# Patient Record
Sex: FEMALE | Race: WHITE | NOT HISPANIC OR LATINO | Employment: FULL TIME | URBAN - METROPOLITAN AREA
[De-identification: names, ages, dates, MRNs, and addresses within clinical notes are randomized per-mention and may not be internally consistent; named-entity substitution may affect disease eponyms.]

---

## 2017-09-15 ENCOUNTER — GENERIC CONVERSION - ENCOUNTER (OUTPATIENT)
Dept: OTHER | Facility: OTHER | Age: 27
End: 2017-09-15

## 2017-09-26 ENCOUNTER — OFFICE VISIT (OUTPATIENT)
Dept: URGENT CARE | Facility: MEDICAL CENTER | Age: 27
End: 2017-09-26
Payer: COMMERCIAL

## 2017-09-26 PROCEDURE — 99203 OFFICE O/P NEW LOW 30 MIN: CPT

## 2018-01-09 NOTE — MISCELLANEOUS
Message   Recorded as Task  Date: 03/04/2016 11:59 AM, Created By: Suzy Mccarthy  Task Name: Call Back  Assigned To: Melody Ayers OB,Nurse Schedule  Regarding Patient: Elmon Prader, Status: In Progress  CommentCarlen Kras - 04 Mar 2016 11:59 AM    TASK CREATED  Caller: Self; [phone] (Home); [phone] (Work)  pt called - she has been having really bad upper back pain  She is asking if there is anything she can do to help the pain  please advise [phone]  Fabiola Finn - 04 Mar 2016 2:03 PM    TASK IN PROGRESS  Fabiola Finn - 04 Mar 2016 2:17 PM    TASK REPLIED TO: Previously Assigned To LASHAE OB,Nurse Schedule  p/c to pt upon questiong pt she was seen in [facility] by Dr Aliyah Cota yesterday and was just recovering from GI bug  Pt was instructed to hydrate  today pt denies CxT, lof, or bleeding and does have +FM  I instructed pt to really concentrate on fluids rather than foods  Pedialyte, chicken soup, Jello, apple sauce, ginger ale, since pt does not have an appetite  Also pt may use warm moist heat and 2 extra strength Tylenol for pain  Pt was agreeable with this plan  Active Problems    1  Encounter for prenatal care in third trimester of first pregnancy (V22 0) (Z34 03)   2  Encounter for supervision of normal first pregnancy in third trimester (V22 0) (Z34 03)   3  Positive GBS test (041 02) (B95 1)   4  Pregnancy (V22 2) (Z33 1)   5  Rubella non-immune status, antepartum (646 83,V15 83) (O99 89,Z28 3)   6  Susceptible to varicella (non-immune), currently pregnant (V49 89) (O09 899,Z28 3)    Current Meds   1  Prenatal Vitamin TABS; TAKE 1 TABLET DAILY AS DIRECTED; Therapy: (Recorded:[id number]) to Recorded    Allergies    1  No Known Drug Allergies    2  No Known Food Allergies   3   Seasonal    Signatures   Electronically signed by : Loco Mcgraw, ; Mar  4 2016  2:17PM EST                       (Author)

## 2018-01-12 NOTE — MISCELLANEOUS
Message  Pt called office  Has spoken with providers in the past about some rectal pressure, today pressure seems to be in bottom and in perineum  Pt has good fetal movements, denies ctx or leaking of fluids  Upon questioning pt states belly does feel hard at times, 1-2x/hour  Pt describes some minor back pain  Pt advised this may be the start of things changing as she prepares for labor  Pt to monitor belly tightening and watch for ctx  Pt advised to call this weekend with any changes or concerns including decreased movement, fluid leaking, increase in ctx  Pt to monitor for now, advised to call with changes  Pt comfortable with this  Active Problems    1  Encounter for prenatal care in third trimester of first pregnancy (V22 0) (Z34 03)   2  Encounter for supervision of normal first pregnancy in third trimester (V22 0) (Z34 03)   3  Positive GBS test (041 02) (B95 1)   4  Pregnancy (V22 2) (Z33 1)   5  Rubella non-immune status, antepartum (646 83,V15 83) (O99 89,Z28 3)   6  Susceptible to varicella (non-immune), currently pregnant (V49 89) (O09 899,Z28 3)    Current Meds   1  Prenatal Vitamin TABS; TAKE 1 TABLET DAILY AS DIRECTED; Therapy: (Recorded:[id number]) to Recorded    Allergies    1  No Known Drug Allergies    2  No Known Food Allergies   3   Seasonal    Signatures   Electronically signed by : Carmelita Amaya, ; Mar 25 2016  2:13PM EST                       (Author)

## 2018-01-13 NOTE — MISCELLANEOUS
Message   Recorded as Task   Date: 02/01/2016 08:42 AM, Created By: Charito Claudio   Task Name: Call Back   Assigned To: Sarthak Hawley   Regarding Patient: Paras Briceño, Status: Active   CommentSommer Oconnor - 01 Feb 2016 8:42 AM     TASK CREATED  Caller: Self; [phone] (Home); [phone] (Work)  pt called - she has been having a lot of lower pressure  please advise [phone]   Valentin Rea - 01 Feb 2016 9:30 AM     TASK REPLIED TO: Previously Assigned To KRISTINGA OB,Team  Pt has mentioned this to providers at previous apt  Has had increased pelvic pressure when standing, and some sharp pains in vaginal area when moving  Pt aware this can be related to babys position  Pt has good fetal movement and denies and bleeding, cramping, fluid leaking  Has been having infrequent brx-harris  Pt advised to try maternity sling to help with pressure  Can try extra strength tylenol, and warm heat - nothing too hot  Pt will try this and knows when to call office for signs of labor or other concerns  Active Problems    1  Pregnancy (V22 2) (Z33 1)   2  Rubella non-immune status, antepartum (646 83,V15 83) (O99 89,Z28 3)   3  Susceptible to varicella (non-immune), currently pregnant (V49 89) (O09 899,Z28 3)    Current Meds   1  Prenatal Vitamin TABS; TAKE 1 TABLET DAILY AS DIRECTED; Therapy: (Recorded:[id number]) to Recorded    Allergies    1  No Known Drug Allergies    2  No Known Food Allergies   3   Seasonal    Signatures   Electronically signed by : Prakash Denis, ; Feb 1 2016  9:30AM EST                       (Author)

## 2018-01-22 VITALS
SYSTOLIC BLOOD PRESSURE: 100 MMHG | WEIGHT: 174 LBS | HEIGHT: 66 IN | BODY MASS INDEX: 27.97 KG/M2 | DIASTOLIC BLOOD PRESSURE: 72 MMHG

## 2018-04-20 PROBLEM — M54.16 LUMBAR RADICULOPATHY: Status: ACTIVE | Noted: 2017-09-26

## 2018-04-23 ENCOUNTER — OFFICE VISIT (OUTPATIENT)
Dept: OBGYN CLINIC | Facility: CLINIC | Age: 28
End: 2018-04-23
Payer: COMMERCIAL

## 2018-04-23 VITALS — DIASTOLIC BLOOD PRESSURE: 58 MMHG | WEIGHT: 168.6 LBS | BODY MASS INDEX: 27.63 KG/M2 | SYSTOLIC BLOOD PRESSURE: 110 MMHG

## 2018-04-23 DIAGNOSIS — N91.2 AMENORRHEA: Primary | ICD-10-CM

## 2018-04-23 PROCEDURE — 76801 OB US < 14 WKS SINGLE FETUS: CPT | Performed by: OBSTETRICS & GYNECOLOGY

## 2018-04-23 NOTE — PROGRESS NOTES
Early OB Ultrasound Procedure Note  Dony Devine  YOB: 1990      Referring Physician: Self, Referral     Technician: Study performed by the interpreting physician    Indications:  amenorrhea   /58   Wt 76 5 kg (168 lb 9 6 oz)   LMP 01/28/2018 (Exact Date)   BMI 27 63 kg/m²     Patient's last menstrual period was 01/28/2018 (exact date)  , , with EGA of  12 weeks and 1   days    Patient denies vaginal bleeding or brown discharge      Procedure Details  A gestational sac is seen containing a yolk sac and a narayanan embryo    Subchorionic lucency is absent     The embryonic crown-rump length measures 1 53  cm  and calculates to an estimated gestational age of 11 weeks and 0   days     Embryonic cardiac activity is  present  Description of fetal anatomy Normal  Description of ovaries: normal  Description of uterus: normal    Findings:  Viable, narayanan intrauterine pregnancy at 8 weeks,  0 days, with a calculated EDC of December 3, 2018

## 2018-05-07 ENCOUNTER — INITIAL PRENATAL (OUTPATIENT)
Dept: OBGYN CLINIC | Facility: CLINIC | Age: 28
End: 2018-05-07

## 2018-05-07 DIAGNOSIS — Z34.81 PRENATAL CARE, SUBSEQUENT PREGNANCY, FIRST TRIMESTER: Primary | ICD-10-CM

## 2018-05-07 PROCEDURE — OBC: Performed by: OBSTETRICS & GYNECOLOGY

## 2018-05-07 RX ORDER — PNV NO.95/FERROUS FUM/FOLIC AC 28MG-0.8MG
1 TABLET ORAL DAILY
COMMUNITY
End: 2018-05-22

## 2018-05-08 VITALS — WEIGHT: 166 LBS | DIASTOLIC BLOOD PRESSURE: 64 MMHG | SYSTOLIC BLOOD PRESSURE: 100 MMHG | BODY MASS INDEX: 27.2 KG/M2

## 2018-05-08 NOTE — PROGRESS NOTES
Patient here for prenatal intake    Patient lives at home with spouse and 3year old daughter Socorro Stanley  Planned pregnancy  Very happy  Works in  that daughter attends  Obstetric history includes  at 41 wks gestation  Precipitous delivery  Used  for delivery and would like to do the same with this baby  Routine prenatal labs ordered  Declines sequential screening  Did receive Flu vaccine this season  Oriented to new office and expected schedule of visits  Patient is feeling well other than nausea  Denies pain or bleeding  Reviewed meds safe to use in pregnancy  Patient has no further questions at this time  All questions answered

## 2018-05-14 ENCOUNTER — APPOINTMENT (OUTPATIENT)
Dept: LAB | Facility: MEDICAL CENTER | Age: 28
End: 2018-05-14
Payer: COMMERCIAL

## 2018-05-14 DIAGNOSIS — Z34.81 PRENATAL CARE, SUBSEQUENT PREGNANCY, FIRST TRIMESTER: ICD-10-CM

## 2018-05-14 LAB
ABO GROUP BLD: NORMAL
BACTERIA UR QL AUTO: ABNORMAL /HPF
BASOPHILS # BLD AUTO: 0.02 THOUSANDS/ΜL (ref 0–0.1)
BASOPHILS NFR BLD AUTO: 0 % (ref 0–1)
BILIRUB UR QL STRIP: NEGATIVE
BLD GP AB SCN SERPL QL: NEGATIVE
CLARITY UR: CLEAR
COLOR UR: YELLOW
EOSINOPHIL # BLD AUTO: 0.05 THOUSAND/ΜL (ref 0–0.61)
EOSINOPHIL NFR BLD AUTO: 1 % (ref 0–6)
ERYTHROCYTE [DISTWIDTH] IN BLOOD BY AUTOMATED COUNT: 12.9 % (ref 11.6–15.1)
GLUCOSE UR STRIP-MCNC: NEGATIVE MG/DL
HCT VFR BLD AUTO: 38.2 % (ref 34.8–46.1)
HGB BLD-MCNC: 12.6 G/DL (ref 11.5–15.4)
HGB UR QL STRIP.AUTO: ABNORMAL
HYALINE CASTS #/AREA URNS LPF: ABNORMAL /LPF
KETONES UR STRIP-MCNC: NEGATIVE MG/DL
LEUKOCYTE ESTERASE UR QL STRIP: ABNORMAL
LYMPHOCYTES # BLD AUTO: 2.65 THOUSANDS/ΜL (ref 0.6–4.47)
LYMPHOCYTES NFR BLD AUTO: 38 % (ref 14–44)
MCH RBC QN AUTO: 30 PG (ref 26.8–34.3)
MCHC RBC AUTO-ENTMCNC: 33 G/DL (ref 31.4–37.4)
MCV RBC AUTO: 91 FL (ref 82–98)
MONOCYTES # BLD AUTO: 0.48 THOUSAND/ΜL (ref 0.17–1.22)
MONOCYTES NFR BLD AUTO: 7 % (ref 4–12)
NEUTROPHILS # BLD AUTO: 3.79 THOUSANDS/ΜL (ref 1.85–7.62)
NEUTS SEG NFR BLD AUTO: 54 % (ref 43–75)
NITRITE UR QL STRIP: NEGATIVE
NON-SQ EPI CELLS URNS QL MICRO: ABNORMAL /HPF
NRBC BLD AUTO-RTO: 0 /100 WBCS
PH UR STRIP.AUTO: 6.5 [PH] (ref 4.5–8)
PLATELET # BLD AUTO: 232 THOUSANDS/UL (ref 149–390)
PMV BLD AUTO: 10 FL (ref 8.9–12.7)
PROT UR STRIP-MCNC: NEGATIVE MG/DL
RBC # BLD AUTO: 4.2 MILLION/UL (ref 3.81–5.12)
RBC #/AREA URNS AUTO: ABNORMAL /HPF
RH BLD: POSITIVE
RUBV IGG SERPL IA-ACNC: 8.7 IU/ML
SP GR UR STRIP.AUTO: 1.01 (ref 1–1.03)
SPECIMEN EXPIRATION DATE: NORMAL
UROBILINOGEN UR QL STRIP.AUTO: 0.2 E.U./DL
WBC # BLD AUTO: 7 THOUSAND/UL (ref 4.31–10.16)
WBC #/AREA URNS AUTO: ABNORMAL /HPF

## 2018-05-14 PROCEDURE — 80081 OBSTETRIC PANEL INC HIV TSTG: CPT

## 2018-05-14 PROCEDURE — 81001 URINALYSIS AUTO W/SCOPE: CPT

## 2018-05-14 PROCEDURE — 36415 COLL VENOUS BLD VENIPUNCTURE: CPT

## 2018-05-14 PROCEDURE — 87086 URINE CULTURE/COLONY COUNT: CPT

## 2018-05-15 LAB
BACTERIA UR CULT: NORMAL
HBV SURFACE AG SER QL: NORMAL
RPR SER QL: NORMAL

## 2018-05-16 LAB — HIV 1+2 AB+HIV1 P24 AG SERPL QL IA: NORMAL

## 2018-05-22 ENCOUNTER — ROUTINE PRENATAL (OUTPATIENT)
Dept: OBGYN CLINIC | Facility: CLINIC | Age: 28
End: 2018-05-22

## 2018-05-22 VITALS — DIASTOLIC BLOOD PRESSURE: 66 MMHG | SYSTOLIC BLOOD PRESSURE: 102 MMHG | WEIGHT: 167.8 LBS | BODY MASS INDEX: 27.5 KG/M2

## 2018-05-22 DIAGNOSIS — Z34.91 ENCOUNTER FOR PREGNANCY RELATED EXAMINATION IN FIRST TRIMESTER: ICD-10-CM

## 2018-05-22 DIAGNOSIS — Z12.4 CERVICAL CANCER SCREENING: ICD-10-CM

## 2018-05-22 DIAGNOSIS — F41.9 ANXIETY DURING PREGNANCY, ANTEPARTUM: ICD-10-CM

## 2018-05-22 DIAGNOSIS — O99.340 ANXIETY DURING PREGNANCY, ANTEPARTUM: ICD-10-CM

## 2018-05-22 DIAGNOSIS — Z34.81 ENCOUNTER FOR SUPERVISION OF OTHER NORMAL PREGNANCY IN FIRST TRIMESTER: Primary | ICD-10-CM

## 2018-05-22 DIAGNOSIS — O22.41 HEMORRHOIDS DURING PREGNANCY IN FIRST TRIMESTER: ICD-10-CM

## 2018-05-22 PROBLEM — Z34.90 SUPERVISION OF NORMAL PREGNANCY: Status: ACTIVE | Noted: 2018-05-22

## 2018-05-22 PROCEDURE — G0145 SCR C/V CYTO,THINLAYER,RESCR: HCPCS | Performed by: NURSE PRACTITIONER

## 2018-05-22 PROCEDURE — 87491 CHLMYD TRACH DNA AMP PROBE: CPT | Performed by: NURSE PRACTITIONER

## 2018-05-22 PROCEDURE — 87591 N.GONORRHOEAE DNA AMP PROB: CPT | Performed by: NURSE PRACTITIONER

## 2018-05-22 PROCEDURE — PNV: Performed by: NURSE PRACTITIONER

## 2018-05-22 NOTE — ASSESSMENT & PLAN NOTE
Small external nonthrombosed hemorrhoid present  Reviewed meds safe in preg  Encouraged increased hydration

## 2018-05-22 NOTE — ASSESSMENT & PLAN NOTE
Jorge Luis Hence describes intermittent symptoms of anxiety since first delivery 2 years ago  This is manifested as excessively worrying about her daughter's safety  She denies depression, SI/HI; she has good support from her spouse  She demonstrates approp behavior with good insight at today's visit  We discussed management options of therapy, support group, medical management  The patient elects expectant management for now   She is aware she may f/u any time for referral

## 2018-05-22 NOTE — ASSESSMENT & PLAN NOTE
PN1   EDC by 1st trimester US  OB hx of prior term  with uncomplicated prenatal course  Jazmine Krueger denies complaints  Exam WNL   by Doppler  Prenatal labs with equivocal rubella status, otherwise WNL  Rh pos  Pap and gc/chl sent today  Discussed low risk aneuploidy screening - the patient declines  Encouraged scheduling L2  Reviewed diet/activity recommendations, practice set up, visit intervals and reasons to call

## 2018-05-22 NOTE — PROGRESS NOTES
Problem List Items Addressed This Visit     RESOLVED: Encounter for pregnancy related examination in first trimester    Relevant Orders    Chlamydia/GC amplified DNA by PCR    Supervision of normal pregnancy - Primary     PN1  EDC by 1st trimester US  OB hx of prior term  with uncomplicated prenatal course  Tamara Villa denies complaints  Exam WNL   by Doppler  Prenatal labs with equivocal rubella status, otherwise WNL  Rh pos  Pap and gc/chl sent today  Discussed low risk aneuploidy screening - the patient declines  Encouraged scheduling L2  Reviewed diet/activity recommendations, practice set up, visit intervals and reasons to call  Hemorrhoids during pregnancy in first trimester     Small external nonthrombosed hemorrhoid present  Reviewed meds safe in preg  Encouraged increased hydration  Anxiety during pregnancy, antepartum     Tamara Villa describes intermittent symptoms of anxiety since first delivery 2 years ago  This is manifested as excessively worrying about her daughter's safety  She denies depression, SI/HI; she has good support from her spouse  She demonstrates approp behavior with good insight at today's visit  We discussed management options of therapy, support group, medical management  The patient elects expectant management for now   She is aware she may f/u any time for referral             Other Visit Diagnoses     Cervical cancer screening        Relevant Orders    Liquid-based pap, screening

## 2018-05-23 LAB
CHLAMYDIA DNA CVX QL NAA+PROBE: NORMAL
N GONORRHOEA DNA GENITAL QL NAA+PROBE: NORMAL

## 2018-05-25 LAB
LAB AP GYN PRIMARY INTERPRETATION: NORMAL
Lab: NORMAL

## 2018-05-30 DIAGNOSIS — Z34.92 PRENATAL CARE IN SECOND TRIMESTER: Primary | ICD-10-CM

## 2018-05-31 ENCOUNTER — TELEPHONE (OUTPATIENT)
Dept: OBGYN CLINIC | Facility: CLINIC | Age: 28
End: 2018-05-31

## 2018-05-31 NOTE — TELEPHONE ENCOUNTER
----- Message from Teagan Leiva, 10 Hemanth Guerin sent at 5/31/2018  5:03 PM EDT -----  Normal pap   Please notify patient by cell per her request

## 2018-06-08 ENCOUNTER — TELEPHONE (OUTPATIENT)
Dept: OBGYN CLINIC | Facility: CLINIC | Age: 28
End: 2018-06-08

## 2018-06-08 NOTE — TELEPHONE ENCOUNTER
Spoke with pt - gave her all the advisement and recommendations from Vahid about her questions regarding pregnancy  Patient was very happy with these advisements

## 2018-06-08 NOTE — TELEPHONE ENCOUNTER
Feeling movement with baby #2 may be as early as 14ish weeks or as late as 20ish weeks  Most people report feeling this around 15-16 weeks  Rashes can be common in preg  If no itchy or raised and not accompanied by other sx then likely benign, but use best judgement and f/u with PCP if needed  Right shoulder blade pain could be related to musculoskeletal changes or it could be GI etiology  If gallbladder then this would be exacerbated by eating  F/u with PCP as needed for this as well  Please provide reassurance that none of these conditions sound worrisome however we are happy to see her any time if sx become severe or are accompanied by pelvic pain, bleeding

## 2018-06-08 NOTE — TELEPHONE ENCOUNTER
----- Message from Katalinachuck Huntley sent at 6/8/2018 12:46 PM EDT -----  Regarding: Non-Urgent Medical Question  Contact: [phone]  Ji Pace,     I had just a few silly questions before my next appointment and I didn't want to forget them  For baby #2 when do you start to feel kicks? Can you develop what seems like a rash (not itchy or raised)? I have a dull pain in my right shoulder towards my shoulder blade that I'm wondering if it could be resulting in pregnancy nonsense? None of these questions are important but I know I will forget them by Tuesday! Thanks!

## 2018-06-19 ENCOUNTER — ROUTINE PRENATAL (OUTPATIENT)
Dept: OBGYN CLINIC | Facility: CLINIC | Age: 28
End: 2018-06-19

## 2018-06-19 VITALS — SYSTOLIC BLOOD PRESSURE: 118 MMHG | WEIGHT: 164 LBS | BODY MASS INDEX: 26.88 KG/M2 | DIASTOLIC BLOOD PRESSURE: 76 MMHG

## 2018-06-19 DIAGNOSIS — Z34.82 MULTIGRAVIDA IN SECOND TRIMESTER: Primary | ICD-10-CM

## 2018-06-19 PROCEDURE — PNV: Performed by: PHYSICIAN ASSISTANT

## 2018-06-19 NOTE — PROGRESS NOTES
Problem List Items Addressed This Visit     Multigravida in second trimester - Primary     Feels well overall  Stressed because it took 1 5 hours to get here due to traffic  Declined genetic testing  No fetal movement yet  Has level II scheduled  All first OB labs, Pap and cultures neg

## 2018-06-19 NOTE — ASSESSMENT & PLAN NOTE
Feels well overall  Stressed because it took 1 5 hours to get here due to traffic  Declined genetic testing  No fetal movement yet  Has level II scheduled  All first OB labs, Pap and cultures neg

## 2018-07-17 ENCOUNTER — ROUTINE PRENATAL (OUTPATIENT)
Dept: PERINATAL CARE | Facility: CLINIC | Age: 28
End: 2018-07-17
Payer: COMMERCIAL

## 2018-07-17 ENCOUNTER — ROUTINE PRENATAL (OUTPATIENT)
Dept: OBGYN CLINIC | Facility: CLINIC | Age: 28
End: 2018-07-17

## 2018-07-17 VITALS
SYSTOLIC BLOOD PRESSURE: 106 MMHG | WEIGHT: 170.9 LBS | HEART RATE: 76 BPM | HEIGHT: 66 IN | DIASTOLIC BLOOD PRESSURE: 70 MMHG | BODY MASS INDEX: 27.47 KG/M2

## 2018-07-17 VITALS — BODY MASS INDEX: 27.28 KG/M2 | WEIGHT: 169 LBS | DIASTOLIC BLOOD PRESSURE: 50 MMHG | SYSTOLIC BLOOD PRESSURE: 108 MMHG

## 2018-07-17 DIAGNOSIS — Z3A.20 20 WEEKS GESTATION OF PREGNANCY: ICD-10-CM

## 2018-07-17 DIAGNOSIS — Z36.3 ENCOUNTER FOR ANTENATAL SCREENING FOR MALFORMATIONS: Primary | ICD-10-CM

## 2018-07-17 DIAGNOSIS — O35.8XX0 ECHOGENIC FOCUS OF HEART OF FETUS AFFECTING ANTEPARTUM CARE OF MOTHER, SINGLE OR UNSPECIFIED FETUS: ICD-10-CM

## 2018-07-17 DIAGNOSIS — Z36.86 ENCOUNTER FOR ANTENATAL SCREENING FOR CERVICAL LENGTH: ICD-10-CM

## 2018-07-17 DIAGNOSIS — Z34.82 MULTIGRAVIDA IN SECOND TRIMESTER: Primary | ICD-10-CM

## 2018-07-17 DIAGNOSIS — Z34.92 PRENATAL CARE IN SECOND TRIMESTER: ICD-10-CM

## 2018-07-17 PROCEDURE — 76817 TRANSVAGINAL US OBSTETRIC: CPT | Performed by: OBSTETRICS & GYNECOLOGY

## 2018-07-17 PROCEDURE — 76811 OB US DETAILED SNGL FETUS: CPT | Performed by: OBSTETRICS & GYNECOLOGY

## 2018-07-17 PROCEDURE — 99212 OFFICE O/P EST SF 10 MIN: CPT | Performed by: OBSTETRICS & GYNECOLOGY

## 2018-07-17 PROCEDURE — PNV: Performed by: OBSTETRICS & GYNECOLOGY

## 2018-07-17 NOTE — PROGRESS NOTES
A transvaginal ultrasound was performed  Sonographer note on use of High Level Disinfection Process (Trophon) for transvaginal probe# 1 used, serial [id number]    Stan Bass

## 2018-07-17 NOTE — PROGRESS NOTES
Level 2 done today- It's a boy! Has a 28 week growth scan scheduled secondary to ?intracardiac echogenic focus (report not available)  Told not to worry because it was otherwise a normal anatomy scan  She is hoping for another natural   Discussed ways to combat leg cramps in pregnancy

## 2018-07-18 PROBLEM — O35.8XX0 ECHOGENIC FOCUS OF HEART OF FETUS AFFECTING ANTEPARTUM CARE OF MOTHER: Status: ACTIVE | Noted: 2018-07-18

## 2018-07-18 NOTE — PROGRESS NOTES
Please refer to the [facility] ultrasound report in OB procedures for additional information regarding the visit today

## 2018-08-13 ENCOUNTER — TELEPHONE (OUTPATIENT)
Dept: OBGYN CLINIC | Facility: CLINIC | Age: 28
End: 2018-08-13

## 2018-08-13 NOTE — TELEPHONE ENCOUNTER
Pt called office today, left voicemail message  Pt's RYAN is 12/3/18, GA 24 wks + 0 dys,  2 Para 1  Pt is scheduled to see Dr Jewell Hi tomorrow  Pt states she became very dizzy at work, left work early, was able to drive home  Pt further states she feels "okay", is scheduled to see provider tomorrow, just wanted to alert staff of dizziness  Pt can be reached @ [phone]

## 2018-08-13 NOTE — TELEPHONE ENCOUNTER
I spoke with patient, she is feeling better, sitting down, drinking gatorade seemed to help  I told her to be sure she is eating and drinking throughout the day, she will discuss tomorrow

## 2018-08-14 ENCOUNTER — ROUTINE PRENATAL (OUTPATIENT)
Dept: OBGYN CLINIC | Facility: CLINIC | Age: 28
End: 2018-08-14

## 2018-08-14 VITALS — BODY MASS INDEX: 28.73 KG/M2 | WEIGHT: 178 LBS | DIASTOLIC BLOOD PRESSURE: 60 MMHG | SYSTOLIC BLOOD PRESSURE: 114 MMHG

## 2018-08-14 DIAGNOSIS — Z34.82 MULTIGRAVIDA IN SECOND TRIMESTER: Primary | ICD-10-CM

## 2018-08-14 DIAGNOSIS — Z34.93 PREGNANCY, OBSTETRICAL CARE, THIRD TRIMESTER: ICD-10-CM

## 2018-08-14 PROCEDURE — PNV: Performed by: OBSTETRICS & GYNECOLOGY

## 2018-08-14 NOTE — PROGRESS NOTES
Here for 24 week appt,  Pt had a near syncopal episode yesterday at work  She thinks she may have stood up too fast  She did not lose consciousness but did go home early and rested for the day  Baby was moving well throughout the episode and afterwards  She also has a patch on the back of her left leg of vericose veins that are bothering her  Otherwise no complaints  28 week lab slip given

## 2018-08-15 NOTE — PROGRESS NOTES
Patient doing well  Suspect near syncopal episode was secondary to quick position change, she is feeling well today  Continuing to maintain good hydration  Has follow up ultrasound 3rd trimester  Area of concern in her thigh consistent with bruise - no palpable or visible varicosities

## 2018-08-17 ENCOUNTER — APPOINTMENT (OUTPATIENT)
Dept: LAB | Facility: HOSPITAL | Age: 28
End: 2018-08-17
Attending: OBSTETRICS & GYNECOLOGY
Payer: COMMERCIAL

## 2018-08-17 DIAGNOSIS — Z34.93 PREGNANCY, OBSTETRICAL CARE, THIRD TRIMESTER: ICD-10-CM

## 2018-08-17 LAB
BASOPHILS # BLD AUTO: 0.02 THOUSANDS/ΜL (ref 0–0.1)
BASOPHILS NFR BLD AUTO: 0 % (ref 0–1)
EOSINOPHIL # BLD AUTO: 0.07 THOUSAND/ΜL (ref 0–0.61)
EOSINOPHIL NFR BLD AUTO: 1 % (ref 0–6)
ERYTHROCYTE [DISTWIDTH] IN BLOOD BY AUTOMATED COUNT: 13.2 % (ref 11.6–15.1)
GLUCOSE 1H P 50 G GLC PO SERPL-MCNC: 132 MG/DL
HCT VFR BLD AUTO: 35.5 % (ref 34.8–46.1)
HGB BLD-MCNC: 11.5 G/DL (ref 11.5–15.4)
IMM GRANULOCYTES # BLD AUTO: 0.07 THOUSAND/UL (ref 0–0.2)
IMM GRANULOCYTES NFR BLD AUTO: 1 % (ref 0–2)
LYMPHOCYTES # BLD AUTO: 1.98 THOUSANDS/ΜL (ref 0.6–4.47)
LYMPHOCYTES NFR BLD AUTO: 27 % (ref 14–44)
MCH RBC QN AUTO: 30.3 PG (ref 26.8–34.3)
MCHC RBC AUTO-ENTMCNC: 32.4 G/DL (ref 31.4–37.4)
MCV RBC AUTO: 93 FL (ref 82–98)
MONOCYTES # BLD AUTO: 0.4 THOUSAND/ΜL (ref 0.17–1.22)
MONOCYTES NFR BLD AUTO: 5 % (ref 4–12)
NEUTROPHILS # BLD AUTO: 4.88 THOUSANDS/ΜL (ref 1.85–7.62)
NEUTS SEG NFR BLD AUTO: 66 % (ref 43–75)
NRBC BLD AUTO-RTO: 0 /100 WBCS
PLATELET # BLD AUTO: 193 THOUSANDS/UL (ref 149–390)
PMV BLD AUTO: 9.8 FL (ref 8.9–12.7)
RBC # BLD AUTO: 3.8 MILLION/UL (ref 3.81–5.12)
WBC # BLD AUTO: 7.42 THOUSAND/UL (ref 4.31–10.16)

## 2018-08-17 PROCEDURE — 86592 SYPHILIS TEST NON-TREP QUAL: CPT

## 2018-08-17 PROCEDURE — 85025 COMPLETE CBC W/AUTO DIFF WBC: CPT

## 2018-08-17 PROCEDURE — 36415 COLL VENOUS BLD VENIPUNCTURE: CPT

## 2018-08-17 PROCEDURE — 82950 GLUCOSE TEST: CPT

## 2018-08-20 ENCOUNTER — TELEPHONE (OUTPATIENT)
Dept: OBGYN CLINIC | Facility: CLINIC | Age: 28
End: 2018-08-20

## 2018-08-20 ENCOUNTER — ROUTINE PRENATAL (OUTPATIENT)
Dept: OBGYN CLINIC | Facility: CLINIC | Age: 28
End: 2018-08-20

## 2018-08-20 VITALS — DIASTOLIC BLOOD PRESSURE: 54 MMHG | SYSTOLIC BLOOD PRESSURE: 102 MMHG | BODY MASS INDEX: 28.57 KG/M2 | WEIGHT: 177 LBS

## 2018-08-20 DIAGNOSIS — Z34.92 SUPERVISION OF LOW-RISK PREGNANCY, SECOND TRIMESTER: ICD-10-CM

## 2018-08-20 DIAGNOSIS — M54.9 BACK PAIN AFFECTING PREGNANCY IN SECOND TRIMESTER: Primary | ICD-10-CM

## 2018-08-20 DIAGNOSIS — O99.891 BACK PAIN AFFECTING PREGNANCY IN SECOND TRIMESTER: Primary | ICD-10-CM

## 2018-08-20 LAB
BACTERIA UR QL AUTO: ABNORMAL /HPF
BILIRUB UR QL STRIP: NEGATIVE
CLARITY UR: CLEAR
COLOR UR: YELLOW
GLUCOSE UR STRIP-MCNC: NEGATIVE MG/DL
HGB UR QL STRIP.AUTO: NEGATIVE
HYALINE CASTS #/AREA URNS LPF: ABNORMAL /LPF
KETONES UR STRIP-MCNC: NEGATIVE MG/DL
LEUKOCYTE ESTERASE UR QL STRIP: ABNORMAL
NITRITE UR QL STRIP: NEGATIVE
NON-SQ EPI CELLS URNS QL MICRO: ABNORMAL /HPF
PH UR STRIP.AUTO: 6.5 [PH] (ref 4.5–8)
PROT UR STRIP-MCNC: NEGATIVE MG/DL
RBC #/AREA URNS AUTO: ABNORMAL /HPF
RPR SER QL: NORMAL
SP GR UR STRIP.AUTO: 1.01 (ref 1–1.03)
UROBILINOGEN UR QL STRIP.AUTO: 0.2 E.U./DL
WBC #/AREA URNS AUTO: ABNORMAL /HPF

## 2018-08-20 PROCEDURE — PNV: Performed by: NURSE PRACTITIONER

## 2018-08-20 PROCEDURE — 87147 CULTURE TYPE IMMUNOLOGIC: CPT | Performed by: NURSE PRACTITIONER

## 2018-08-20 PROCEDURE — 81001 URINALYSIS AUTO W/SCOPE: CPT | Performed by: NURSE PRACTITIONER

## 2018-08-20 PROCEDURE — 87086 URINE CULTURE/COLONY COUNT: CPT | Performed by: NURSE PRACTITIONER

## 2018-08-20 NOTE — ASSESSMENT & PLAN NOTE
C/o right sided mid back pain occurring intermittently x4 days  Onset occurs at night while laying down  This is dull to stabbing  She denies exacerbating factors; she does find comfort if she elevates her right leg while laying down  Tylenol and heating pad are ineffective  She denies fever, chills, dysuria, hematuria, N/V/D/C  Denies ctx/cramping/LOF/VB  Kenia Bullockrs is in NAD and well appearing  Afebrile  Exam without CVA tenderness or point tenderness on palpation of spine, SI joint  Abd is soft, NT, gravid  Advised MSK etiology suspected given positional presentation  Low suspicion of pyelo or hydronephrosis although urine studies sent and would consider renal US if pain worsens or does not improve  Discussed comfort measures incl massage, chiro and PT  The patient declines PT referral at thsi time but is aware she may f/u any time  Advised to keep next appt as scheduled

## 2018-08-20 NOTE — PROGRESS NOTES
Patient c/o right sided pain  She is taking Tylenol but no relief  She can't get comfortable due to the pain  It mostly happens when she is laying down

## 2018-08-20 NOTE — TELEPHONE ENCOUNTER
Samia - You need to call and triage this    It is not appropriate to send an email like this directly to a provider - it needs to be triaged first

## 2018-08-20 NOTE — TELEPHONE ENCOUNTER
----- Message from Qian Castro sent at 8/18/2018 12:03 PM EDT -----  Regarding: Non-Urgent Medical Question  Contact: [phone]  Ji Paez! I had a quick question  I have been having pretty constant pain on my right side where my natural waist is, making it super hard to sleep because even with pillows it just hurts  Anything you recommend doing? ? Is it just round ligament pain I have to deal with? ? Thanks!       Baudilio Friedman

## 2018-08-20 NOTE — PROGRESS NOTES
Problem List Items Addressed This Visit     Supervision of low-risk pregnancy, second trimester     Denies OB complaints  Good fetal movement  Denies contractions, cramping, leakage of fluid or vaginal bleeding  Reviewed reasons to call  Back pain affecting pregnancy in second trimester - Primary     C/o right sided mid back pain occurring intermittently x4 days  Onset occurs at night while laying down  This is dull to stabbing  She denies exacerbating factors; she does find comfort if she elevates her right leg while laying down  Tylenol and heating pad are ineffective  She denies fever, chills, dysuria, hematuria, N/V/D/C  Denies ctx/cramping/LOF/VB  Romeo Chandler is in NAD and well appearing  Afebrile  Exam without CVA tenderness or point tenderness on palpation of spine, SI joint  Abd is soft, NT, gravid  Advised MSK etiology suspected given positional presentation  Low suspicion of pyelo or hydronephrosis although urine studies sent and would consider renal US if pain worsens or does not improve  Discussed comfort measures incl massage, chiro and PT  The patient declines PT referral at thsi time but is aware she may f/u any time  Advised to keep next appt as scheduled             Relevant Orders    Urine culture    Urinalysis with reflex to microscopic

## 2018-08-20 NOTE — TELEPHONE ENCOUNTER
Spoke with pt - pain has been happening for a couple of days - it is around where her natural waist would be  But only on the right side  The pain would only be when she is laying down - when she is sitting or standing it is fine  Using a heating pad and taking tylenol  No leakage of fluid, headaches or dizziness  Good FM - no contractions  No urinary symptoms  Patient is worried  Spoke with Paul Hugo - she advised pt to come in   Patient is scheduled to see Paul Hugo today at 1pm

## 2018-08-22 ENCOUNTER — TELEPHONE (OUTPATIENT)
Dept: OBGYN CLINIC | Facility: CLINIC | Age: 28
End: 2018-08-22

## 2018-08-22 LAB
BACTERIA UR CULT: ABNORMAL
BACTERIA UR CULT: ABNORMAL

## 2018-08-22 NOTE — TELEPHONE ENCOUNTER
----- Message from Andrew Parra, 10 Hemanth Guerin sent at 8/22/2018  5:25 PM EDT -----  Normal urine studies   These were ordered for r/o pyelo  Please notify patient

## 2018-09-05 ENCOUNTER — TELEPHONE (OUTPATIENT)
Dept: OBGYN CLINIC | Facility: CLINIC | Age: 28
End: 2018-09-05

## 2018-09-05 NOTE — TELEPHONE ENCOUNTER
Incoming call from patient 27 wks gestation  Patient works in  for years  Has two children that have been diagnosed with hand foot mouth syndrome  Would like to know if she needs to take any additional precautions  Currently asymptomatic

## 2018-09-05 NOTE — TELEPHONE ENCOUNTER
----- Message from Dominic Hayes sent at 9/4/2018  8:01 PM EDT -----  Regarding: Non-Urgent Medical Question  Contact: [phone]  Ji Waldron! Quick question  I work at a  and one infant (9 months) was diagnosed yesterday morning with a vey mild case of HFM  He came in in the AM and we saw blisters and called mom  He was diagnosed with roseola and mild HFM and the doctor said he was absolutely not contagious and was given a note to return to  tomorrow  Should I be worried??? I'm a little nervous with my unborn kiddo here and my two year old  Please advise! Thanks so much!

## 2018-09-05 NOTE — TELEPHONE ENCOUNTER
Left message for patient not a concern in pregnancy  Encouraged good hand hygiene  Advised to call back with any symptoms are concerns

## 2018-09-10 ENCOUNTER — ULTRASOUND (OUTPATIENT)
Dept: PERINATAL CARE | Facility: CLINIC | Age: 28
End: 2018-09-10
Payer: COMMERCIAL

## 2018-09-10 ENCOUNTER — ROUTINE PRENATAL (OUTPATIENT)
Dept: OBGYN CLINIC | Facility: CLINIC | Age: 28
End: 2018-09-10

## 2018-09-10 VITALS — BODY MASS INDEX: 29.57 KG/M2 | SYSTOLIC BLOOD PRESSURE: 100 MMHG | DIASTOLIC BLOOD PRESSURE: 60 MMHG | WEIGHT: 183.2 LBS

## 2018-09-10 VITALS
HEART RATE: 97 BPM | BODY MASS INDEX: 29.25 KG/M2 | DIASTOLIC BLOOD PRESSURE: 71 MMHG | SYSTOLIC BLOOD PRESSURE: 104 MMHG | HEIGHT: 66 IN | WEIGHT: 182 LBS

## 2018-09-10 DIAGNOSIS — R82.71 GBS BACTERIURIA: ICD-10-CM

## 2018-09-10 DIAGNOSIS — Z3A.28 28 WEEKS GESTATION OF PREGNANCY: ICD-10-CM

## 2018-09-10 DIAGNOSIS — Z34.93 SUPERVISION OF LOW-RISK PREGNANCY, THIRD TRIMESTER: Primary | ICD-10-CM

## 2018-09-10 DIAGNOSIS — M54.9 BACK PAIN AFFECTING PREGNANCY IN SECOND TRIMESTER: ICD-10-CM

## 2018-09-10 DIAGNOSIS — O99.891 BACK PAIN AFFECTING PREGNANCY IN SECOND TRIMESTER: ICD-10-CM

## 2018-09-10 DIAGNOSIS — Z36.89 ENCOUNTER FOR FETAL ANATOMIC SURVEY: Primary | ICD-10-CM

## 2018-09-10 PROCEDURE — 76816 OB US FOLLOW-UP PER FETUS: CPT | Performed by: OBSTETRICS & GYNECOLOGY

## 2018-09-10 PROCEDURE — PNV: Performed by: NURSE PRACTITIONER

## 2018-09-10 PROCEDURE — 99212 OFFICE O/P EST SF 10 MIN: CPT | Performed by: OBSTETRICS & GYNECOLOGY

## 2018-09-10 RX ORDER — LORATADINE 10 MG/1
10 TABLET ORAL DAILY
COMMUNITY
End: 2018-12-28 | Stop reason: ALTCHOICE

## 2018-09-10 NOTE — ASSESSMENT & PLAN NOTE
<10k GBS count present in 8/20/18 urine culture  Reviewed results with patient and advised intrapartum antibiotic prophylaxis

## 2018-09-10 NOTE — PROGRESS NOTES
Please refer to the [facility] ultrasound report in Ob Procedures for additional information regarding the visit to the [facility]  today

## 2018-09-10 NOTE — PROGRESS NOTES
Problem List Items Addressed This Visit     Supervision of low-risk pregnancy, third trimester - Primary     Denies OB complaints  Good fetal movement  Denies contractions, cramping, leakage of fluid or vaginal bleeding  28 wk labs WNL  US today for growth and missed anatomy - EFW 81%, AC 82%, KARAN 19 2, vertex  BPD >95% and this concerns her  Reassurance provided; discussed late 3rd trimester considerations if baby ends up being LGA  Lawerence Bob desires to defer tdap until next visit  Baby and Me considerations reinforced  Reviewed  labor precautions and FKCs  Back pain affecting pregnancy in second trimester     Resolved  GBS bacteriuria     <10k GBS count present in 18 urine culture  Reviewed results with patient and advised intrapartum antibiotic prophylaxis

## 2018-09-10 NOTE — LETTER
September 10, 2018     Darin Etienne [address]    Patient: Tapan Lara   YOB: 1990   Date of Visit: 9/10/2018       Dear Dr James Harris: Thank you for referring Justice Orta to me for evaluation  Below are my notes for this consultation  If you have questions, please do not hesitate to call me  I look forward to following your patient along with you  Sincerely,        Mainor Muniz MD        CC: No Recipients  Mainor Muniz MD  9/10/2018  1:56 PM  Sign at close encounter  Please refer to the [facility] ultrasound report in Ob Procedures for additional information regarding the [facility]  today

## 2018-09-10 NOTE — ASSESSMENT & PLAN NOTE
Denies OB complaints  Good fetal movement  Denies contractions, cramping, leakage of fluid or vaginal bleeding  28 wk labs WNL  US today for growth and missed anatomy - EFW 81%, AC 82%, KRAAN 19 2, vertex  BPD >95% and this concerns her  Reassurance provided; discussed late 3rd trimester considerations if baby ends up being LGA  Luis A Carlton desires to defer tdap until next visit  Baby and Me considerations reinforced  Reviewed  labor precautions and FKCs

## 2018-09-25 ENCOUNTER — ROUTINE PRENATAL (OUTPATIENT)
Dept: OBGYN CLINIC | Facility: CLINIC | Age: 28
End: 2018-09-25
Payer: COMMERCIAL

## 2018-09-25 VITALS — SYSTOLIC BLOOD PRESSURE: 112 MMHG | WEIGHT: 181.8 LBS | BODY MASS INDEX: 29.34 KG/M2 | DIASTOLIC BLOOD PRESSURE: 60 MMHG

## 2018-09-25 DIAGNOSIS — Z23 NEED FOR TETANUS, DIPHTHERIA, AND ACELLULAR PERTUSSIS (TDAP) VACCINE IN PATIENT OF ADOLESCENT AGE OR OLDER: ICD-10-CM

## 2018-09-25 DIAGNOSIS — Z34.93 SUPERVISION OF LOW-RISK PREGNANCY, THIRD TRIMESTER: Primary | ICD-10-CM

## 2018-09-25 PROCEDURE — 90715 TDAP VACCINE 7 YRS/> IM: CPT

## 2018-09-25 PROCEDURE — PNV: Performed by: NURSE PRACTITIONER

## 2018-09-25 PROCEDURE — 90471 IMMUNIZATION ADMIN: CPT

## 2018-09-25 NOTE — ASSESSMENT & PLAN NOTE
Denies OB complaints  Good fetal movement  Denies contractions, cramping, leakage of fluid or vaginal bleeding  Tdap administered today  Pt will defer flu vaccine until next visit  Baby and Me considerations reinforced  Reviewed  labor precautions and FKCs

## 2018-09-25 NOTE — PROGRESS NOTES
Problem List Items Addressed This Visit     Supervision of low-risk pregnancy, third trimester - Primary     Denies OB complaints  Good fetal movement  Denies contractions, cramping, leakage of fluid or vaginal bleeding  Tdap administered today  Pt will defer flu vaccine until next visit  Baby and Me considerations reinforced  Reviewed  labor precautions and FKCs                Other Visit Diagnoses     Need for tetanus, diphtheria, and acellular pertussis (Tdap) vaccine in patient of adolescent age or older        Relevant Orders    TDAP Vaccine greater than or equal to 6yo (Completed)

## 2018-10-09 ENCOUNTER — ROUTINE PRENATAL (OUTPATIENT)
Dept: OBGYN CLINIC | Facility: CLINIC | Age: 28
End: 2018-10-09
Payer: COMMERCIAL

## 2018-10-09 VITALS — DIASTOLIC BLOOD PRESSURE: 60 MMHG | BODY MASS INDEX: 29.86 KG/M2 | SYSTOLIC BLOOD PRESSURE: 110 MMHG | WEIGHT: 185 LBS

## 2018-10-09 DIAGNOSIS — Z34.93 SUPERVISION OF LOW-RISK PREGNANCY, THIRD TRIMESTER: Primary | ICD-10-CM

## 2018-10-09 DIAGNOSIS — Z23 NEED FOR INFLUENZA VACCINATION: ICD-10-CM

## 2018-10-09 PROCEDURE — PNV: Performed by: OBSTETRICS & GYNECOLOGY

## 2018-10-09 PROCEDURE — 90471 IMMUNIZATION ADMIN: CPT | Performed by: OBSTETRICS & GYNECOLOGY

## 2018-10-09 PROCEDURE — 90686 IIV4 VACC NO PRSV 0.5 ML IM: CPT | Performed by: OBSTETRICS & GYNECOLOGY

## 2018-10-09 NOTE — PROGRESS NOTES
Tony Olivera is doing well  Some BH contractions, nothing that has worried her  S/P Flu and TDAP  Rh positive  PTL precautions reviewed

## 2018-10-25 ENCOUNTER — ROUTINE PRENATAL (OUTPATIENT)
Dept: OBGYN CLINIC | Facility: CLINIC | Age: 28
End: 2018-10-25

## 2018-10-25 VITALS — BODY MASS INDEX: 30.8 KG/M2 | DIASTOLIC BLOOD PRESSURE: 62 MMHG | WEIGHT: 190.8 LBS | SYSTOLIC BLOOD PRESSURE: 110 MMHG

## 2018-10-25 DIAGNOSIS — Z34.93 THIRD TRIMESTER PREGNANCY: ICD-10-CM

## 2018-10-25 DIAGNOSIS — Z34.93 SUPERVISION OF LOW-RISK PREGNANCY, THIRD TRIMESTER: Primary | ICD-10-CM

## 2018-10-25 DIAGNOSIS — O36.8390 FETAL ARRHYTHMIA AFFECTING PREGNANCY, ANTEPARTUM: ICD-10-CM

## 2018-10-25 PROCEDURE — PNV: Performed by: OBSTETRICS & GYNECOLOGY

## 2018-10-25 NOTE — ASSESSMENT & PLAN NOTE
Irregularity on auscultation today  Suspect PACs, NST reactive 140's  No evidence tachy arrhythmia  She will see [facility] tomorrow, 10/26 for follow up scan  Kick counts reviewed, reassurance provided

## 2018-10-26 ENCOUNTER — ULTRASOUND (OUTPATIENT)
Dept: PERINATAL CARE | Facility: CLINIC | Age: 28
End: 2018-10-26
Payer: COMMERCIAL

## 2018-10-26 VITALS
SYSTOLIC BLOOD PRESSURE: 103 MMHG | DIASTOLIC BLOOD PRESSURE: 70 MMHG | HEIGHT: 66 IN | WEIGHT: 189 LBS | HEART RATE: 90 BPM | BODY MASS INDEX: 30.37 KG/M2

## 2018-10-26 DIAGNOSIS — O36.8390 FETAL ARRHYTHMIA AFFECTING PREGNANCY, ANTEPARTUM: ICD-10-CM

## 2018-10-26 DIAGNOSIS — O36.63X0 LARGE FOR GESTATIONAL AGE FETUS AFFECTING MANAGEMENT OF MOTHER, ANTEPARTUM, THIRD TRIMESTER, NOT APPLICABLE OR UNSPECIFIED FETUS: Primary | ICD-10-CM

## 2018-10-26 DIAGNOSIS — Z34.93 THIRD TRIMESTER PREGNANCY: ICD-10-CM

## 2018-10-26 DIAGNOSIS — D18.01 CHERRY ANGIOMA: ICD-10-CM

## 2018-10-26 DIAGNOSIS — Z3A.34 34 WEEKS GESTATION OF PREGNANCY: ICD-10-CM

## 2018-10-26 PROCEDURE — 99213 OFFICE O/P EST LOW 20 MIN: CPT | Performed by: OBSTETRICS & GYNECOLOGY

## 2018-10-26 PROCEDURE — 76816 OB US FOLLOW-UP PER FETUS: CPT | Performed by: OBSTETRICS & GYNECOLOGY

## 2018-10-26 NOTE — PROGRESS NOTES
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 that has been there for 4-5 days and has had bleeding episodes x3 including this morning  In review of some articles on Google she will try apple cider vinegar on it but if not working treatment can be cryotherapy  She is seeing Dr Astrid Garcia next  Will see if that is an option to be completed in his office  Fetal growth today in the 82nd percentile  Her prior ultrasound was in the 81st percentile  Fetus appears symmetric  Her last baby weighed 8 pounds 2 ounces and was born at 36 weeks and she reports a 45 minutes 2nd stage without complications  That baby was a girl and this baby is felt to be a boy and Sunnie Hammans feels this pregnancy does feel bigger than her prior pregnancy  She had a nml glucola of 132  Irregular PACs are noted on today's ultrasound  She will decrease her caffeine intake to one cup of coffee per day to see if that improves the arrhythmia  She has had a 20 week ultrasound in the past which showed no fetal cardiac malformations  Recommend weekly fetal heart tones by Dopplers to detect if there is a sustained SVT  This can be done in her OB office  Recommend one last growth scan in four weeks if undelivered  She will review with Dr Astrid Garcia at her next visit to see if his office has cryotherapy that can be used to treat her cherry angioma if the apple cider vinegar does not work       Luke Bruce MD

## 2018-11-06 ENCOUNTER — ROUTINE PRENATAL (OUTPATIENT)
Dept: OBGYN CLINIC | Facility: CLINIC | Age: 28
End: 2018-11-06

## 2018-11-06 VITALS — DIASTOLIC BLOOD PRESSURE: 70 MMHG | SYSTOLIC BLOOD PRESSURE: 112 MMHG | BODY MASS INDEX: 31.02 KG/M2 | WEIGHT: 192.2 LBS

## 2018-11-06 DIAGNOSIS — Z34.93 THIRD TRIMESTER PREGNANCY: Primary | ICD-10-CM

## 2018-11-06 DIAGNOSIS — Z3A.36 36 WEEKS GESTATION OF PREGNANCY: ICD-10-CM

## 2018-11-06 PROCEDURE — PNV: Performed by: OBSTETRICS & GYNECOLOGY

## 2018-11-06 PROCEDURE — 87653 STREP B DNA AMP PROBE: CPT | Performed by: OBSTETRICS & GYNECOLOGY

## 2018-11-06 NOTE — ASSESSMENT & PLAN NOTE
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

## 2018-11-06 NOTE — PROGRESS NOTES
Problem List Items Addressed This Visit        Other    36 weeks gestation of pregnancy     Feels well  Hemangioma on forehead continues to spot at times  No OB complaints  GBS obtained  Silver nitrate applied to hemangioma             Other Visit Diagnoses     Third trimester pregnancy    -  Primary    Relevant Orders    Strep B DNA probe, amplification

## 2018-11-08 ENCOUNTER — TELEPHONE (OUTPATIENT)
Dept: OBGYN CLINIC | Facility: CLINIC | Age: 28
End: 2018-11-08

## 2018-11-08 LAB — GP B STREP DNA SPEC QL NAA+PROBE: ABNORMAL

## 2018-11-12 ENCOUNTER — ROUTINE PRENATAL (OUTPATIENT)
Dept: OBGYN CLINIC | Facility: CLINIC | Age: 28
End: 2018-11-12

## 2018-11-12 VITALS — BODY MASS INDEX: 31.38 KG/M2 | SYSTOLIC BLOOD PRESSURE: 114 MMHG | WEIGHT: 194.4 LBS | DIASTOLIC BLOOD PRESSURE: 66 MMHG

## 2018-11-12 DIAGNOSIS — Z3A.37 37 WEEKS GESTATION OF PREGNANCY: ICD-10-CM

## 2018-11-12 DIAGNOSIS — Z34.93 SUPERVISION OF LOW-RISK PREGNANCY, THIRD TRIMESTER: Primary | ICD-10-CM

## 2018-11-12 DIAGNOSIS — R82.71 GBS BACTERIURIA: ICD-10-CM

## 2018-11-12 PROCEDURE — PNV: Performed by: NURSE PRACTITIONER

## 2018-11-12 NOTE — PROGRESS NOTES
Problem List Items Addressed This Visit     Supervision of low-risk pregnancy, third trimester - Primary     Denies OB complaints  Good fetal movement  Denies contractions, cramping, leakage of fluid or vaginal bleeding  S/p flu and tdap vaccines  Baby and Me considerations reinforced  Reviewed labor precautions and FKCs  GBS bacteriuria     Pt is aware of plans for abx in labor           37 weeks gestation of pregnancy

## 2018-11-12 NOTE — ASSESSMENT & PLAN NOTE
Denies OB complaints  Good fetal movement  Denies contractions, cramping, leakage of fluid or vaginal bleeding  S/p flu and tdap vaccines  Baby and Me considerations reinforced  Reviewed labor precautions and FKCs

## 2018-11-20 ENCOUNTER — ULTRASOUND (OUTPATIENT)
Dept: PERINATAL CARE | Facility: CLINIC | Age: 28
End: 2018-11-20
Payer: COMMERCIAL

## 2018-11-20 ENCOUNTER — ROUTINE PRENATAL (OUTPATIENT)
Dept: OBGYN CLINIC | Facility: CLINIC | Age: 28
End: 2018-11-20

## 2018-11-20 VITALS
DIASTOLIC BLOOD PRESSURE: 79 MMHG | WEIGHT: 197.4 LBS | HEIGHT: 66 IN | BODY MASS INDEX: 31.72 KG/M2 | HEART RATE: 91 BPM | SYSTOLIC BLOOD PRESSURE: 116 MMHG

## 2018-11-20 VITALS — BODY MASS INDEX: 31.96 KG/M2 | SYSTOLIC BLOOD PRESSURE: 112 MMHG | DIASTOLIC BLOOD PRESSURE: 64 MMHG | WEIGHT: 198 LBS

## 2018-11-20 DIAGNOSIS — Z34.93 SUPERVISION OF LOW-RISK PREGNANCY, THIRD TRIMESTER: ICD-10-CM

## 2018-11-20 DIAGNOSIS — O36.63X1: ICD-10-CM

## 2018-11-20 DIAGNOSIS — O36.63X0 EXCESSIVE FETAL GROWTH AFFECTING MANAGEMENT OF PREGNANCY IN THIRD TRIMESTER, SINGLE OR UNSPECIFIED FETUS: ICD-10-CM

## 2018-11-20 DIAGNOSIS — O99.891 BACK PAIN AFFECTING PREGNANCY IN SECOND TRIMESTER: ICD-10-CM

## 2018-11-20 DIAGNOSIS — O99.213 OBESITY COMPLICATING PREGNANCY, THIRD TRIMESTER: Primary | ICD-10-CM

## 2018-11-20 DIAGNOSIS — Z3A.38 38 WEEKS GESTATION OF PREGNANCY: ICD-10-CM

## 2018-11-20 DIAGNOSIS — R82.71 GBS BACTERIURIA: ICD-10-CM

## 2018-11-20 DIAGNOSIS — Z34.93 SUPERVISION OF LOW-RISK PREGNANCY, THIRD TRIMESTER: Primary | ICD-10-CM

## 2018-11-20 DIAGNOSIS — O36.8390 FETAL ARRHYTHMIA AFFECTING PREGNANCY, ANTEPARTUM: ICD-10-CM

## 2018-11-20 DIAGNOSIS — M54.9 BACK PAIN AFFECTING PREGNANCY IN SECOND TRIMESTER: ICD-10-CM

## 2018-11-20 PROCEDURE — 76816 OB US FOLLOW-UP PER FETUS: CPT | Performed by: OBSTETRICS & GYNECOLOGY

## 2018-11-20 PROCEDURE — 99212 OFFICE O/P EST SF 10 MIN: CPT | Performed by: OBSTETRICS & GYNECOLOGY

## 2018-11-20 PROCEDURE — PNV: Performed by: OBSTETRICS & GYNECOLOGY

## 2018-11-20 NOTE — PROGRESS NOTES
Please refer to the [facility] ultrasound report in Ob Procedures for additional information regarding the visit to the [facility]  today

## 2018-11-20 NOTE — PROGRESS NOTES
Raysa Thomas is doing okay - feeling a lot of rectal pressure  No contractions, discharge, etc   Baby moving well  Had scan with PNC this morning - EFW 91%, AC > 95%, Dr Yamel Neumann encouraged consideration repeat 1 hour  She is in agreement with having this done  She does not think this baby will be much bigger than her last, for which she had an easy labor  She is interested with an elective IOL on/after 39 weeks if possible  Scheduled for 11/26 @8pm for cervical ripening  She will let us know if she changes her mind  Continue issues with her cherry angioma  No further evidence of fetal PACs

## 2018-11-20 NOTE — LETTER
November 20, 2018     Baudilio Pantoja DO  [address] [facility] [address]    Patient: Lizzie Resendez   YOB: 1990   Date of Visit: 11/20/2018       Dear Dr Carisa Cordero: Thank you for referring Leonora Jang to me for evaluation  Below are my notes for this consultation  If you have questions, please do not hesitate to call me  I look forward to following your patient along with you  Sincerely,        Ismael Gomez MD        CC: No Recipients  Ismael Gomez MD  11/20/2018 11:35 AM  Sign at close encounter  Please refer to the [facility] ultrasound report in Ob Procedures for additional information regarding the visit to the [facility]  today

## 2018-11-21 ENCOUNTER — APPOINTMENT (OUTPATIENT)
Dept: LAB | Facility: MEDICAL CENTER | Age: 28
End: 2018-11-21
Payer: COMMERCIAL

## 2018-11-21 ENCOUNTER — TELEPHONE (OUTPATIENT)
Dept: LABOR AND DELIVERY | Facility: HOSPITAL | Age: 28
End: 2018-11-21

## 2018-11-21 LAB — GLUCOSE 1H P 50 G GLC PO SERPL-MCNC: 83 MG/DL

## 2018-11-21 PROCEDURE — 36415 COLL VENOUS BLD VENIPUNCTURE: CPT | Performed by: OBSTETRICS & GYNECOLOGY

## 2018-11-21 PROCEDURE — 82950 GLUCOSE TEST: CPT | Performed by: OBSTETRICS & GYNECOLOGY

## 2018-11-26 ENCOUNTER — HOSPITAL ENCOUNTER (INPATIENT)
Facility: HOSPITAL | Age: 28
LOS: 3 days | Discharge: HOME | End: 2018-11-29
Attending: OBSTETRICS & GYNECOLOGY | Admitting: OBSTETRICS & GYNECOLOGY
Payer: COMMERCIAL

## 2018-11-26 ENCOUNTER — HOSPITAL ENCOUNTER (OUTPATIENT)
Dept: LABOR AND DELIVERY | Facility: HOSPITAL | Age: 28
Discharge: HOME | End: 2018-11-26
Payer: COMMERCIAL

## 2018-11-26 DIAGNOSIS — Z3A.37 37 WEEKS GESTATION OF PREGNANCY: Primary | ICD-10-CM

## 2018-11-26 PROBLEM — O36.63X0 EXCESSIVE FETAL GROWTH AFFECTING MANAGEMENT OF PREGNANCY IN THIRD TRIMESTER: Status: ACTIVE | Noted: 2018-11-26

## 2018-11-26 PROBLEM — Z3A.39 39 WEEKS GESTATION OF PREGNANCY: Status: ACTIVE | Noted: 2018-11-06

## 2018-11-26 LAB
ERYTHROCYTE [DISTWIDTH] IN BLOOD BY AUTOMATED COUNT: 14.2 % (ref 11.6–15.1)
HCT VFR BLD AUTO: 37.1 % (ref 34.8–46.1)
HGB BLD-MCNC: 12.3 G/DL (ref 11.5–15.4)
MCH RBC QN AUTO: 30.6 PG (ref 26.8–34.3)
MCHC RBC AUTO-ENTMCNC: 33.2 G/DL (ref 31.4–37.4)
MCV RBC AUTO: 92 FL (ref 82–98)
PLATELET # BLD AUTO: 180 THOUSANDS/UL (ref 149–390)
PMV BLD AUTO: 10.8 FL (ref 8.9–12.7)
RBC # BLD AUTO: 4.02 MILLION/UL (ref 3.81–5.12)
WBC # BLD AUTO: 10.63 THOUSAND/UL (ref 4.31–10.16)

## 2018-11-26 PROCEDURE — 86850 RBC ANTIBODY SCREEN: CPT | Performed by: STUDENT IN AN ORGANIZED HEALTH CARE EDUCATION/TRAINING PROGRAM

## 2018-11-26 PROCEDURE — 3E0P7VZ INTRODUCTION OF HORMONE INTO FEMALE REPRODUCTIVE, VIA NATURAL OR ARTIFICIAL OPENING: ICD-10-PCS | Performed by: OBSTETRICS & GYNECOLOGY

## 2018-11-26 PROCEDURE — 86901 BLOOD TYPING SEROLOGIC RH(D): CPT | Performed by: STUDENT IN AN ORGANIZED HEALTH CARE EDUCATION/TRAINING PROGRAM

## 2018-11-26 PROCEDURE — 86900 BLOOD TYPING SEROLOGIC ABO: CPT | Performed by: STUDENT IN AN ORGANIZED HEALTH CARE EDUCATION/TRAINING PROGRAM

## 2018-11-26 PROCEDURE — 4A1HXCZ MONITORING OF PRODUCTS OF CONCEPTION, CARDIAC RATE, EXTERNAL APPROACH: ICD-10-PCS | Performed by: OBSTETRICS & GYNECOLOGY

## 2018-11-26 PROCEDURE — 86592 SYPHILIS TEST NON-TREP QUAL: CPT | Performed by: STUDENT IN AN ORGANIZED HEALTH CARE EDUCATION/TRAINING PROGRAM

## 2018-11-26 PROCEDURE — 85027 COMPLETE CBC AUTOMATED: CPT | Performed by: STUDENT IN AN ORGANIZED HEALTH CARE EDUCATION/TRAINING PROGRAM

## 2018-11-26 RX ORDER — CALCIUM CARBONATE 200(500)MG
1000 TABLET,CHEWABLE ORAL DAILY PRN
Status: DISCONTINUED | OUTPATIENT
Start: 2018-11-26 | End: 2018-11-27

## 2018-11-26 RX ORDER — ACETAMINOPHEN 325 MG/1
650 TABLET ORAL EVERY 4 HOURS PRN
Status: DISCONTINUED | OUTPATIENT
Start: 2018-11-26 | End: 2018-11-27

## 2018-11-26 RX ORDER — ONDANSETRON 2 MG/ML
4 INJECTION INTRAMUSCULAR; INTRAVENOUS EVERY 8 HOURS PRN
Status: DISCONTINUED | OUTPATIENT
Start: 2018-11-26 | End: 2018-11-27

## 2018-11-26 RX ORDER — SODIUM CHLORIDE, SODIUM LACTATE, POTASSIUM CHLORIDE, CALCIUM CHLORIDE 600; 310; 30; 20 MG/100ML; MG/100ML; MG/100ML; MG/100ML
125 INJECTION, SOLUTION INTRAVENOUS CONTINUOUS
Status: DISCONTINUED | OUTPATIENT
Start: 2018-11-26 | End: 2018-11-27

## 2018-11-26 RX ADMIN — MISOPROSTOL 25 MCG: 100 TABLET ORAL at 22:16

## 2018-11-26 RX ADMIN — SODIUM CHLORIDE, SODIUM LACTATE, POTASSIUM CHLORIDE, AND CALCIUM CHLORIDE 125 ML/HR: .6; .31; .03; .02 INJECTION, SOLUTION INTRAVENOUS at 22:08

## 2018-11-26 RX ADMIN — SODIUM CHLORIDE 5 MILLION UNITS: 0.9 INJECTION, SOLUTION INTRAVENOUS at 22:08

## 2018-11-27 LAB
ABO GROUP BLD: NORMAL
BASE EXCESS BLDCOA CALC-SCNC: -2.2 MMOL/L (ref 3–11)
BASE EXCESS BLDCOV CALC-SCNC: -2.6 MMOL/L (ref 1–9)
BLD GP AB SCN SERPL QL: NEGATIVE
HCO3 BLDCOA-SCNC: 23.2 MMOL/L (ref 17.3–27.3)
HCO3 BLDCOV-SCNC: 25.2 MMOL/L (ref 12.2–28.6)
O2 CT VFR BLDCOA CALC: 15.8 ML/DL
OXYHGB MFR BLDCOA: 76.1 %
OXYHGB MFR BLDCOV: 43.8 %
PCO2 BLDCOA: 42.1 MM[HG] (ref 30–60)
PCO2 BLDCOV: 55.5 MM HG (ref 27–43)
PH BLDCOA: 7.36 [PH] (ref 7.23–7.43)
PH BLDCOV: 7.28 [PH] (ref 7.19–7.49)
PO2 BLDCOA: 35.9 MM HG (ref 5–25)
PO2 BLDCOV: 22.3 MM HG (ref 15–45)
RH BLD: POSITIVE
RPR SER QL: NORMAL
SAO2 % BLDCOV: 9.6 ML/DL
SPECIMEN EXPIRATION DATE: NORMAL

## 2018-11-27 PROCEDURE — 3E033VJ INTRODUCTION OF OTHER HORMONE INTO PERIPHERAL VEIN, PERCUTANEOUS APPROACH: ICD-10-PCS | Performed by: OBSTETRICS & GYNECOLOGY

## 2018-11-27 PROCEDURE — 59400 OBSTETRICAL CARE: CPT | Performed by: OBSTETRICS & GYNECOLOGY

## 2018-11-27 PROCEDURE — 82805 BLOOD GASES W/O2 SATURATION: CPT | Performed by: OBSTETRICS & GYNECOLOGY

## 2018-11-27 PROCEDURE — 10907ZC DRAINAGE OF AMNIOTIC FLUID, THERAPEUTIC FROM PRODUCTS OF CONCEPTION, VIA NATURAL OR ARTIFICIAL OPENING: ICD-10-PCS | Performed by: OBSTETRICS & GYNECOLOGY

## 2018-11-27 PROCEDURE — 0KQM0ZZ REPAIR PERINEUM MUSCLE, OPEN APPROACH: ICD-10-PCS | Performed by: OBSTETRICS & GYNECOLOGY

## 2018-11-27 RX ORDER — ONDANSETRON 2 MG/ML
4 INJECTION INTRAMUSCULAR; INTRAVENOUS EVERY 8 HOURS PRN
Status: DISCONTINUED | OUTPATIENT
Start: 2018-11-27 | End: 2018-11-29 | Stop reason: HOSPADM

## 2018-11-27 RX ORDER — LIDOCAINE HYDROCHLORIDE 10 MG/ML
INJECTION, SOLUTION EPIDURAL; INFILTRATION; INTRACAUDAL; PERINEURAL
Status: COMPLETED
Start: 2018-11-27 | End: 2018-11-27

## 2018-11-27 RX ORDER — OXYCODONE HYDROCHLORIDE AND ACETAMINOPHEN 5; 325 MG/1; MG/1
2 TABLET ORAL EVERY 4 HOURS PRN
Status: DISCONTINUED | OUTPATIENT
Start: 2018-11-27 | End: 2018-11-29 | Stop reason: HOSPADM

## 2018-11-27 RX ORDER — OXYCODONE HYDROCHLORIDE AND ACETAMINOPHEN 5; 325 MG/1; MG/1
1 TABLET ORAL EVERY 4 HOURS PRN
Status: DISCONTINUED | OUTPATIENT
Start: 2018-11-27 | End: 2018-11-29 | Stop reason: HOSPADM

## 2018-11-27 RX ORDER — OXYTOCIN/RINGER'S LACTATE 30/500 ML
250 PLASTIC BAG, INJECTION (ML) INTRAVENOUS CONTINUOUS
Status: DISCONTINUED | OUTPATIENT
Start: 2018-11-27 | End: 2018-11-29 | Stop reason: HOSPADM

## 2018-11-27 RX ORDER — IBUPROFEN 600 MG/1
600 TABLET ORAL EVERY 6 HOURS PRN
Status: DISCONTINUED | OUTPATIENT
Start: 2018-11-27 | End: 2018-11-29 | Stop reason: HOSPADM

## 2018-11-27 RX ORDER — CALCIUM CARBONATE 200(500)MG
1000 TABLET,CHEWABLE ORAL DAILY PRN
Status: DISCONTINUED | OUTPATIENT
Start: 2018-11-27 | End: 2018-11-29 | Stop reason: HOSPADM

## 2018-11-27 RX ORDER — OXYTOCIN/RINGER'S LACTATE 30/500 ML
1-30 PLASTIC BAG, INJECTION (ML) INTRAVENOUS
Status: DISCONTINUED | OUTPATIENT
Start: 2018-11-27 | End: 2018-11-27

## 2018-11-27 RX ORDER — DIAPER,BRIEF,INFANT-TODD,DISP
1 EACH MISCELLANEOUS AS NEEDED
Status: DISCONTINUED | OUTPATIENT
Start: 2018-11-27 | End: 2018-11-29 | Stop reason: HOSPADM

## 2018-11-27 RX ORDER — ACETAMINOPHEN 325 MG/1
650 TABLET ORAL EVERY 6 HOURS PRN
Status: DISCONTINUED | OUTPATIENT
Start: 2018-11-27 | End: 2018-11-29 | Stop reason: HOSPADM

## 2018-11-27 RX ORDER — DIPHENHYDRAMINE HYDROCHLORIDE 50 MG/ML
25 INJECTION INTRAMUSCULAR; INTRAVENOUS EVERY 6 HOURS PRN
Status: DISCONTINUED | OUTPATIENT
Start: 2018-11-27 | End: 2018-11-29 | Stop reason: HOSPADM

## 2018-11-27 RX ORDER — DOCUSATE SODIUM 100 MG/1
100 CAPSULE, LIQUID FILLED ORAL 2 TIMES DAILY
Status: DISCONTINUED | OUTPATIENT
Start: 2018-11-27 | End: 2018-11-29 | Stop reason: HOSPADM

## 2018-11-27 RX ADMIN — MISOPROSTOL 25 MCG: 100 TABLET ORAL at 03:05

## 2018-11-27 RX ADMIN — Medication 2 MILLI-UNITS/MIN: at 07:15

## 2018-11-27 RX ADMIN — HYDROCORTISONE 1 APPLICATION: 1 CREAM TOPICAL at 11:04

## 2018-11-27 RX ADMIN — BENZOCAINE AND LEVOMENTHOL: 200; 5 SPRAY TOPICAL at 11:04

## 2018-11-27 RX ADMIN — SODIUM CHLORIDE, SODIUM LACTATE, POTASSIUM CHLORIDE, AND CALCIUM CHLORIDE 125 ML/HR: .6; .31; .03; .02 INJECTION, SOLUTION INTRAVENOUS at 05:07

## 2018-11-27 RX ADMIN — IBUPROFEN 600 MG: 600 TABLET ORAL at 11:03

## 2018-11-27 RX ADMIN — LIDOCAINE HYDROCHLORIDE 30 ML: 10 INJECTION, SOLUTION EPIDURAL; INFILTRATION; INTRACAUDAL; PERINEURAL at 10:00

## 2018-11-27 RX ADMIN — DOCUSATE SODIUM 100 MG: 100 CAPSULE, LIQUID FILLED ORAL at 18:57

## 2018-11-27 RX ADMIN — WITCH HAZEL 1 PAD: 500 SOLUTION RECTAL; TOPICAL at 11:04

## 2018-11-27 RX ADMIN — SODIUM CHLORIDE 2.5 MILLION UNITS: 9 INJECTION, SOLUTION INTRAVENOUS at 06:37

## 2018-11-27 RX ADMIN — ACETAMINOPHEN 650 MG: 325 TABLET, FILM COATED ORAL at 16:27

## 2018-11-27 RX ADMIN — IBUPROFEN 600 MG: 600 TABLET ORAL at 18:57

## 2018-11-27 RX ADMIN — SODIUM CHLORIDE 2.5 MILLION UNITS: 9 INJECTION, SOLUTION INTRAVENOUS at 02:40

## 2018-11-27 NOTE — OB LABOR/OXYTOCIN SAFETY PROGRESS
Oxytocin Safety Progress Check Note - Davidson Portillo 29 y o  female MRN: [id number]    Unit/Bed#: -01 Encounter: [id number]    Gestational Age: 36w3d  Dose (lalitha-units/min) Oxytocin: 4 lalitha-units/min  Contraction Frequency (minutes): 4 5-4 5  Contraction Quality: Moderate  Tachysystole: No   Dilation: 2        Effacement (%): 70  Station: -3  Baseline Rate: 135 bpm  Fetal Heart Rate: 140 BPM  FHR Category: Category I          Notes/comments:     Patient is comfortable  Her  is coming, she is listening to a podcast   Deferred check at this time  Pitocin was started at 0715, at 4mU/min  Cat I tracing              Gareth Frederick MD 11/27/2018 8:16 AM

## 2018-11-27 NOTE — L&D DELIVERY NOTE
Delivery Summary - OB/GYN   Benji Ortiz 29 y o  female MRN: [id number]  Unit/Bed#: -01 Encounter: [id number]    Pre-delivery Diagnosis:   1  39w1d pregnancy      Post-delivery Diagnosis: same, delivered    Attending: Dr Nneka Toro    Assistant(s): Dr Maliha Warner    Procedure:     Anesthesia: no epidural    Estimated Blood Loss:  300 mL    Specimens:   1  Arterial and venous cord gases  2  Cord blood  3  Segment of umbilical cord  4  Placenta to storage     Complications:  None apparent    Findings:  1  Viable male  delivered on 18 at 0957 weighing 8lbs 6oz;  Apgar scores of 9 at one minute and 9 at five minutes  2  Spontaneous delivery of placenta with centrally inserted 3-vessel cord  3  2nd degree perineal laceration, repaired with 3-0Vicryl rapid       Disposition: Patient tolerated the procedure well and was recovering in labor and delivery room with family and  before being transferred to the post-partum floor  Procedure Details     Description of procedure  Patient admitted for induction of labor  Induction started with Cytotec every 3 hours  She has had 2 dose of vaginal Cytotec  The cervical dilation progressively appreciated and complete dilation was at 0954  After pushing for 3 minutes, on 18 at 0957 patient delivered a viable male , weighing 3799g, Apgars of 9 (1 min) and 9 (5 min)  The fetal vertex delivered spontaneously  There was no nuchal cord  The anterior shoulder delivered atraumatically with maternal expulsive forces and the assistance of downward traction  The posterior shoulder delivered with maternal expulsive forces and the assistance of upward traction  The remainder of the fetus delivered spontaneously  Upon delivery, the infant was placed on the mothers abdomen and the cord was clamped and cut  The infant was noted to cry spontaneously and was moving all extremities appropriately  There was no evidence for injury   Awaiting nurse resuscitators evaluated the  at bedside  Arterial and venous cord blood gases and cord blood was collected for analysis  These were promptly sent to the lab  In the immediate post-partum, 30 units of IV pitocin was administered and the uterus was noted to contract down well with massage and pitocin  The placenta delivered spontaneously at 1004 and was noted to have a second degree perineal laceration centrally inserted 3 vessel cord  The vagina, cervix, and perineum were inspected and there was noted to be 2nd degree laceration  Laceration Repair  Patient was comfortable with epidural at that time  A second degree perineal laceration was identified and required repair  Laceration was repaired with 3-0 Vicryl rapid in good to reapproximate the laceration  Good hemostasis was confirmed at the conclusion of this procedure  At the conclusion of the delivery, all needle, sponge, and instrument counts were noted to be correct  Patient tolerated the procedure well and was allowed to recover in labor and delivery room with family and  before being transferred to the post-partum floor  Dr Mary Lou Gaines was present and participated in all key portions of the case      MD NONI Pena, PGY-1  2018  1:44 PM

## 2018-11-27 NOTE — DISCHARGE SUMMARY
Discharge Summary - OB/GYN   Seb Brown 29 y o  female MRN: [id number]  Unit/Bed#: -01 Encounter: [id number]      Admission Date: 2018     Discharge Date: 18    Admitting Diagnosis:   1  Pregnancy at 39w1d  2  Migraine  3  Exercise induced  Asthma  4  GBS + status      Discharge Diagnosis:   Same, delivered      Procedures: spontaneous vaginal delivery    Attending: Chris Lewis MD    Hospital Course: Seb Brown is a 29 y o   at 39w1d wks who was initially admitted for elective  induction of labor  Patient admitted for induction of labor  Induction started with Cytotec every 3 hours  She has had 2 dose of vaginal Cytotec  The cervical dilation progressively appreciated and complete dilation was at 0954  She delivered a viable male  on 2018 at 0957  Weight 8lbs 6oz via spontaneous vaginal delivery  Apgars were 9 (1 min) and 9 (5 min)   was transferred to  nursery  Patient tolerated the procedure well and was transferred to recovery in stable condition  Her post-partum course had no complicated  Her post-partum pain was well controlled with oral analgesics  On day of discharge, she was ambulating and able to reasonably perform all ADLs  She was voiding and had appropriate bowel function  Pain was well controlled  She was discharged home on post-partum day #2 without complications  Patient was instructed to follow up with her OB as an outpatient and was given appropriate warnings to call provider if she develops signs of infection or uncontrolled pain  Complications: none apparent    Condition at discharge: good     Discharge instructions/Information to patient and family:   See after visit summary for information provided to patient and family  Provisions for Follow-Up Care:  See after visit summary for information related to follow-up care and any pertinent home health orders        Disposition: Home    Planned Readmission: No    Discharge Medications: For a complete list of the patient's medications, please refer to her med rec

## 2018-11-27 NOTE — OB LABOR/OXYTOCIN SAFETY PROGRESS
Labor Progress Note - Altagracia Patel 29 y o  female MRN: [id number]    Unit/Bed#: LD Triage  Encounter: [id number]    Obstetric History       T1      L1     SAB0   TAB0   Ectopic0   Multiple0   Live Births1    Obstetric Comments   Patient here today for prenatal intake  Planned pregnancy  Very happy  Lives at home with spouse and daughter Cindy Sofia, 3years old  Only pregnancy complaint at this time is nausea  Reviewed Pregnancy Essentials Guide  Patient had  at 41 wks gestation  Patient states precipitous delivery  Used  for delivery and would like to use with this pregnancy as well  Reviewed pregnancy essentials guide  Routine labs ordered  Gestational Age: 36w3d     Contraction Frequency (minutes): 3 5-7  Contraction Quality: Mild      Dilation: 1-2        Effacement (%): 60  Station: -3  Baseline Rate: 130 bpm     FHR Category: Category I          Notes/comments:   Cervical exam as described above  Cervical change made with previous dose of Cytotec  Second dose of Cytotec placed  Patient feeling crampy is unsure of frequency  John on the toco every 5-7 minutes      FHT category 1 baseline 130, no decelerations          Suzanne Blood MD 2018 3:10 AM

## 2018-11-27 NOTE — DISCHARGE INSTRUCTIONS
Vaginal Delivery   AMBULATORY CARE:   What you need to know about vaginal delivery:  A vaginal delivery occurs when your baby is born through your vagina (birth canal)  How to prepare for vaginal delivery: You will not be able to eat while you are in active labor  Your healthcare provider can give you medicines for pain relief if you chose to have them  You may need medicine to induce (start) your labor if your labor is not moving forward  You may need to move in bed, stand, or walk to help your baby move into position for birth  What will happen during vaginal delivery:   · You can move into several positions during delivery  You can lie on your back, have your feet up in stirrups, or squat  You may feel pressure on your rectum  This pressure is caused by the movement of your baby's head down the birth canal  You may feel the urge to push  Your healthcare provider will tell you when to push, and guide your baby out of the birth canal  Healthcare providers may use forceps or suction to help deliver your baby  You may also need an episiotomy (incision) to make the vaginal opening larger  This will make more room for your baby  · After your baby is born, your healthcare provider will put clamps on the cord that connects your baby to the placenta  The cord is then cut  Your uterus continues to contract after delivery to push out the placenta  Your healthcare provider may close your episiotomy incision or any tears with stitches  What will happen after vaginal delivery:   · Healthcare providers will examine your baby  You may be able to hold your baby soon after he or she is born  After healthcare providers have checked that you and your baby are okay, you may be taken to another room  · A healthcare provider may massage your abdomen several times to make your uterus firm  This can be uncomfortable   You may have abdominal pains for up to 3 days after you give birth because your uterus is still hector  The contractions help release blood from inside your uterus so it shrinks back to its normal size  These contractions may hurt more while you breastfeed your baby  · Your healthcare provider may suggest you get out of bed to sit in a chair or walk  Activity can help prevent blood clots  · You may be able to go home within 24 to 48 hours after delivery  If you need support at home, ask your healthcare provider about home visits by another healthcare provider  This healthcare provider can help you learn about breastfeeding, bottle feeding, baby care, and perineum care  Follow up with your healthcare provider:  Most women need to return 6 weeks after a vaginal delivery  Ask your healthcare provider how to care for your wounds or stitches, if you have them  Write down your questions so you remember to ask them during your visits  Seek care immediately if:   · Your leg feels warm, tender, and painful  It may look swollen and red  · You have a fever  · You are urinating very little, or not at all  · You have heavy vaginal bleeding that fills 1 or more sanitary pads in 1 hour  · You feel weak, dizzy, or faint  Contact your healthcare provider if:   · Your abdominal or perineal pain does not go away, or gets worse  · You feel depressed  · You have questions or concerns about your condition or care  Medicines:  · NSAIDs , such as ibuprofen, help decrease swelling, pain, and fever  This medicine is available with or without a doctor's order  NSAIDs can cause stomach bleeding or kidney problems in certain people  If you take blood thinner medicine, always ask your healthcare provider if NSAIDs are safe for you  Always read the medicine label and follow directions  · Stool softeners  make it easier for you to have a bowel movement  You may need this medicine to treat or prevent constipation  · Take your medicine as directed    Contact your healthcare provider if you think your medicine is not helping or if you have side effects  Tell him or her if you are allergic to any medicine  Keep a list of the medicines, vitamins, and herbs you take  Include the amounts, and when and why you take them  Bring the list or the pill bottles to follow-up visits  Carry your medicine list with you in case of an emergency  Activity:  Rest as much as possible  Try to keep all activities short  You may be able to do some exercise soon after you have your baby  Talk with your healthcare provider before you start exercising  If you work outside the home, ask when you can return to your job  Kegel exercises:  Kegel exercises may help your vaginal and rectal muscles heal faster  You can do Kegel exercises by tightening and relaxing the muscles around your vagina  Kegel exercises help make the muscles stronger  Breast care:  When your milk comes in, your breasts may feel full and hard  Ask how to care for your breasts, even if you are not breastfeeding  Constipation:  You may have constipation for a period of time after you have your baby  Do not try to push the bowel movement out if it is too hard  High-fiber foods and extra liquids can help you prevent constipation  Examples of high-fiber foods are fruit and bran  Prune juice and water are good liquids to drink  You may also be told to take over-the-counter fiber and stool softener medicines  Take these items as directed  Ask how to prevent or treat hemorrhoids  Perineum care: Your perineum is the area between your vagina and anus  Keep the area clean and dry  This will help it heal and prevent infection  Wash the area gently with soap and water when you bathe or shower  Rinse your perineum with warm water after you urinate or have a bowel movement  Your healthcare provider may suggest you use a warm sitz bath to help decrease pain  To take a sitz bath, fill a bathtub with 4 to 6 inches of warm water   You may also use a sitz bath pan that fits inside the toilet  Sit in the sitz bath for 20 minutes  Do this 2 to 3 times a day, or as directed  The warm water can help decrease pain and swelling  Vaginal discharge: You will have vaginal discharge, called lochia, after your delivery  The lochia is red or dark brown with clots for 1 to 3 days after the birth  The amount will decrease and turn pale pink or brown for 3 to 10 days  It will turn white or yellow on the 10th or 14th day  Lochia is usually gone within 3 weeks  Use a sanitary pad rather than a tampon to prevent a vaginal infection  You will have lochia for up to 3 weeks after your baby is born  Monthly periods: Your period may start again within 7 to 9 weeks after your baby is born  If you are breastfeeding, it may take longer for your period to start again  You can still get pregnant again even though you do not have your monthly period  Talk with your healthcare provider about a birth control method if you do not want to get pregnant  Mood changes: Many new mothers have some kind of mood changes after delivery  Some of these changes occur because of lack of sleep, hormone changes, and caring for a new baby  Some mood changes can be more serious, such as postpartum depression  Talk with your healthcare provider if you feel unable to care for yourself or your baby  Sexual activity:  Do not have sex until your healthcare provider says it is okay  You may notice you have a decreased desire for sex, or sex may be painful  You may need to use a vaginal lubricant (gel) to help make sex more comfortable  © 2017 [employer]  Information is for End User's use only and may not be sold, redistributed or otherwise used for commercial purposes  All illustrations and images included in CareNotes® are the copyrighted property of [employer]  or Gustavo Starr  The above information is an  only  It is not intended as medical advice for individual conditions or treatments   Talk to your doctor, nurse or pharmacist before following any medical regimen to see if it is safe and effective for you

## 2018-11-27 NOTE — H&P
H&P Exam - Obstetrics- Family Medicine Resident  Jermain Chahal 29 y o  female MRN: [id number]  Unit/Bed#: LD Triage  Encounter: [id number]        ASSESSMENT:  Jermain Chahal is a 29 y o , , 39w0d admitted for induction of labor for estimated fetal weight at 91%, 8 lb 4 oz at last ultrasound 18  PLAN:  Admit  Induction, placed 25 mcg Cytotec vaginally  GBS+, start PCN 5 million units IV  Routine labs:  CBC, RPR, type and screen  Analgesia upon maternal request  Expectant management    D/w Dr Lety Saleem    >2 Midnights  INPATIENT       History of Present Illness   Chief Complaint: Scheduled induction of labor    HPI:  Jermain Chahal is a 29 y o   female with an RYAN of 12/3/2018, by Ultrasound at 39w0d weeks gestation who is being admitted for induction of labor  Her current obstetrical history is significant for varicella non-immune status  She states she developed a cherry hemangioma on her right temple about care home through the pregnancy that would occasionally bleed, but this has improved after application of silver nitrate with no bleeding in the last few weeks  Contractions: denies  Leakage of fluid: None  Bleeding: None  Fetal movement: present  Pregnancy complications: none  Baby complications/comments:   · 10/25/18 prenatal visit, there was irregularity on auscultation, suspected PACs, NST reactive in 140s  Ultrasound 10/26/18 noted irregular PACs as well and she was advised to decrease caffeine intake  No further documentation of any irregularity in heart rate  · 18  ultrasound showed an isolated echogenic intracardiac focus in the left ventricle of the fetal heart  This was not noted on any subsequent ultrasounds  Review of Systems   Constitutional: Negative for chills and fever  HENT: Negative for sneezing and sore throat  Eyes: Negative for visual disturbance  Respiratory: Negative for cough and shortness of breath      Cardiovascular: Negative for chest pain and leg swelling  Gastrointestinal: Negative for abdominal pain, nausea and vomiting  Genitourinary: Negative for dysuria, pelvic pain and vaginal bleeding  Musculoskeletal: Negative for arthralgias and myalgias  Neurological: Negative for dizziness, light-headedness and headaches  Historical Information   OB History    Para Term  AB Living   2 1 1 0 0 1   SAB TAB Ectopic Multiple Live Births   0 0 0 0 1      # Outcome Date GA Lbr Heraclio/2nd Weight Sex Delivery Anes PTL Lv   2 Current            1 Term 16 41w0d  3705 g (8 lb 2 7 oz) F Vag-Spont Local  JUVENTINO      Obstetric Comments   Patient here today for prenatal intake  Planned pregnancy  Very happy  Lives at home with spouse and daughter Superior, 3years old  Only pregnancy complaint at this time is nausea  Reviewed Pregnancy Essentials Guide  Patient had  at 41 wks gestation  Patient states precipitous delivery  Used  for delivery and would like to use with this pregnancy as well  Reviewed pregnancy essentials guide  Routine labs ordered  Past Medical History:   Diagnosis Date    Acne     was on Amoxicillin d/c once she found out she was pregnant    Angioma     developed during pregnancy    Asthma     exercise induced asthma no current inhaler    Migraine     Normal delivery     2016 daughter    Varicella     ?      Past Surgical History:   Procedure Laterality Date    TOOTH EXTRACTION       Social History   History   Alcohol Use No     Comment: social alcohol use per allscripts     History   Drug Use No     History   Smoking Status    Never Smoker   Smokeless Tobacco    Never Used     Family History:   Family History   Problem Relation Age of Onset    Cancer Mother         hodgkins lymphome    No Known Problems Father     No Known Problems Sister     No Known Problems Brother     Cancer Maternal Grandmother         pancreatic    Heart disease Paternal Grandfather         CHF    No Known Problems Daughter        Meds/Allergies     Prescriptions Prior to Admission   Medication    loratadine (CLARITIN) 10 mg tablet    Prenatal Multivit-Min-Fe-FA (PRENATAL #2 PO)     Allergies   Allergen Reactions    Pollen Extract Sneezing       Objective   Vitals: Last menstrual period 01/28/2018, currently breastfeeding  There is no height or weight on file to calculate BMI  Invasive Devices     Drain            Urethral Catheter Latex 15 Fr  970 days                PHYSICAL EXAM  General: No acute distress  Heart: Regular rate & rhythm  No murmurs/clicks/gallops  Lungs: Clear bilaterally  Normal effort  No wheezes/rales/rhonchi  Abdominal: Soft  NT/ND  Gravid  Extremities: No TTP  Lower limbs symmetric bilaterally  SVE: 0/50/-3  FHR: 150 baseline, moderate variability, accelerations present,  no decelerations     Cat1  [place]:  Rare    Prenatal Labs:   Blood Type:   Lab Results   Component Value Date/Time    ABO Grouping B 05/14/2018 04:39 PM    ABO Grouping B 08/25/2015 02:46 PM     , D (Rh type):   Lab Results   Component Value Date/Time    Rh Factor Positive 05/14/2018 04:39 PM    Rh Factor Positive 08/25/2015 02:46 PM     , Antibody Screen:   Lab Results   Component Value Date/Time    Antibody Screen Negative 08/25/2015 02:46 PM    , HCT/HGB:   Lab Results   Component Value Date/Time    Hematocrit 35 5 08/17/2018 08:24 AM    Hematocrit 36 2 12/24/2015 09:58 AM    Hemoglobin 11 5 08/17/2018 08:24 AM    Hemoglobin 12 2 12/24/2015 09:58 AM      , Varicella: No results found for: VARICELLAIGG    , Rubella:   Lab Results   Component Value Date/Time    RUBELLA IGG QUANTITATION 0 9 08/25/2015 02:46 PM    Rubella IgG Quant 8 7 (L) 05/14/2018 04:39 PM        , VDRL/RPR:   Lab Results   Component Value Date/Time    RPR SCREEN Nonreactive 08/25/2015 02:46 PM    RPR Non-Reactive 08/17/2018 08:24 AM      , Hep B:   Lab Results   Component Value Date/Time    HEPATITIS B SURFACE ANTIGEN Nonreactive (NR 08/25/2015 02:46 PM Hepatitis B Surface Ag Non-reactive 05/14/2018 04:39 PM     , HIV:   Lab Results   Component Value Date/Time    HIV-1/HIV-2 Ab Non-Reactive 05/14/2018 04:39 PM     , Chlamydia: No results found for: EXTCHLAMYDIA  , Gonorrhea:   Lab Results   Component Value Date/Time    N gonorrhoeae, DNA Probe N  gonorrhoeae Amplified DNA Negative 05/22/2018 03:57 PM     , Group B Strep:    Lab Results   Component Value Date/Time    Strep Grp B PCR Positive for Beta Hemolytic Strep Grp B by PCR (A) 11/06/2018 03:17 PM            11/26/18  9:49 PM

## 2018-11-27 NOTE — OB LABOR/OXYTOCIN SAFETY PROGRESS
Labor Progress Note - Benji Ortiz 29 y o  female MRN: [id number]    Unit/Bed#: LD Triage  Encounter: [id number]    Obstetric History       T1      L1     SAB0   TAB0   Ectopic0   Multiple0   Live Births1    Obstetric Comments   Patient here today for prenatal intake  Planned pregnancy  Very happy  Lives at home with spouse and daughter Vera Martínez, 3years old  Only pregnancy complaint at this time is nausea  Reviewed Pregnancy Essentials Guide  Patient had  at 41 wks gestation  Patient states precipitous delivery  Used  for delivery and would like to use with this pregnancy as well  Reviewed pregnancy essentials guide  Routine labs ordered  Gestational Age: 36w3d     Contraction Frequency (minutes): 1 5-3 (difficult to trace at times, pt repositioned)  Contraction Quality: Mild      Dilation: 2        Effacement (%): 70  Station: -3  Baseline Rate: 130 bpm     FHR Category: Category I          Notes/comments:   patient feeling crampy with contractions  Not requiring any analgesia      Fetal heart tracing category 1 with baseline 130, no decelerations    John every 1-2 minutes with intermittent 4 minutes pause is    Plan:  Begin titration 4 hours after last Cytotec placement          Shiva Castro MD 2018 6:02 AM

## 2018-11-27 NOTE — PLAN OF CARE
Knowledge Deficit     Patient/family/caregiver demonstrates understanding of disease process, treatment plan, medications, and discharge instructions Completed     Verbalizes understanding of labor plan Completed        Labor & Delivery     Manages discomfort Completed     Patient vital signs are stable Completed          DISCHARGE PLANNING     Discharge to home or other facility with appropriate resources Progressing        INFECTION - ADULT     Absence or prevention of progression during hospitalization Progressing     Absence of fever/infection during neutropenic period Progressing        PAIN - ADULT     Verbalizes/displays adequate comfort level or baseline comfort level Progressing        POSTPARTUM     Experiences normal postpartum course Progressing     Appropriate maternal -  bonding Progressing     Establishment of infant feeding pattern Progressing     Incision(s), wounds(s) or drain site(s) healing without S/S of infection Progressing        SAFETY ADULT     Patient will remain free of falls Progressing     Maintain or return to baseline ADL function Progressing     Maintain or return mobility status to optimal level Progressing

## 2018-11-27 NOTE — OB LABOR/OXYTOCIN SAFETY PROGRESS
Labor Progress Note - Stephanie Myers 29 y o  female MRN: [id number]    Unit/Bed#: -01 Encounter: [id number]    Obstetric History       T1      L1     SAB0   TAB0   Ectopic0   Multiple0   Live Births1    Obstetric Comments   Patient here today for prenatal intake  Planned pregnancy  Very happy  Lives at home with spouse and daughter Ji Rene, 3years old  Only pregnancy complaint at this time is nausea  Reviewed Pregnancy Essentials Guide  Patient had  at 41 wks gestation  Patient states precipitous delivery  Used  for delivery and would like to use with this pregnancy as well  Reviewed pregnancy essentials guide  Routine labs ordered       Gestational Age: 39w1d  Dose (lalitha-units/min) Oxytocin: 6 lalitha-units/min  Contraction Frequency (minutes): 1-2 5  Contraction Quality: Moderate  Tachysystole: No   Dilation: Lip/rim (Comment)        Effacement (%): 100  Station: 1  Baseline Rate: 140 bpm  Fetal Heart Rate: 145 BPM  FHR Category: Category II          Notes/comments:      Pt is in pain, anterior lip, 100% effacement, intact membranes,  cat 1 toco       Mayela Montez MD  5:06 AM

## 2018-11-28 PROCEDURE — 99024 POSTOP FOLLOW-UP VISIT: CPT | Performed by: OBSTETRICS & GYNECOLOGY

## 2018-11-28 RX ADMIN — ACETAMINOPHEN 650 MG: 325 TABLET, FILM COATED ORAL at 08:27

## 2018-11-28 RX ADMIN — DOCUSATE SODIUM 100 MG: 100 CAPSULE, LIQUID FILLED ORAL at 08:27

## 2018-11-28 RX ADMIN — IBUPROFEN 600 MG: 600 TABLET ORAL at 15:43

## 2018-11-28 RX ADMIN — IBUPROFEN 600 MG: 600 TABLET ORAL at 03:46

## 2018-11-28 RX ADMIN — DOCUSATE SODIUM 100 MG: 100 CAPSULE, LIQUID FILLED ORAL at 17:55

## 2018-11-28 RX ADMIN — ACETAMINOPHEN 650 MG: 325 TABLET, FILM COATED ORAL at 19:44

## 2018-11-28 NOTE — PROGRESS NOTES
Progress Note - OB/GYN   Benji Ortiz 29 y o  female MRN: [id number]  Unit/Bed#: -01 Encounter: [id number]    Assessment:  PP#1 s/p Spontaneous Vaginal Delivery, stable    Plan:  1  Encourage ambulation  2  Encourage breastfeeding  3  Anticipate discharge tomorrow     Subjective/Objective   Chief Complaint:     PP#1 s/p Spontaneous Vaginal Delivery    Subjective:     Pain: cramping  Tolerating PO: yes  Voiding: yes  Flatus: yes  BM: no  Ambulating: yes  Breastfeeding: Breastfeeding  Chest pain: no  Shortness of breath: no  Leg pain: no  Lochia: Minimal    Objective:     Vitals:  Vitals:    11/27/18 1628 11/27/18 2013 11/28/18 0005 11/28/18 0404   BP: 119/70 122/77 121/77 142/82   BP Location: Left arm Left arm Right arm Right arm   Pulse: (!) 111 76 85 89   Resp: 20 20 20 20   Temp: 98 °F (36 7 °C) 98 4 °F (36 9 °C) 98 4 °F (36 9 °C) 98 5 °F (36 9 °C)   TempSrc: Oral Oral Oral Oral   SpO2:    97%   Weight:       Height:           Physical Exam:     Physical Exam   Constitutional: She is oriented to person, place, and time  She appears well-developed and well-nourished  No distress  Cardiovascular: Normal rate and regular rhythm  Exam reveals no gallop and no friction rub  No murmur heard  Pulmonary/Chest: Effort normal and breath sounds normal  No respiratory distress  She has no wheezes  She has no rales  Abdominal: Soft  Musculoskeletal: Normal range of motion  Neurological: She is alert and oriented to person, place, and time  Skin: Skin is warm  She is not diaphoretic  Psychiatric: She has a normal mood and affect  Her behavior is normal      Uterine fundus firm and non-tender, -1 cm below the umbilicus       Lab, Imaging and other studies: I have personally reviewed pertinent reports        Lab Results   Component Value Date    WBC 10 63 (H) 11/26/2018    HGB 12 3 11/26/2018    HCT 37 1 11/26/2018    MCV 92 11/26/2018     11/26/2018               Armin Barth MD  36/17/50

## 2018-11-29 VITALS
SYSTOLIC BLOOD PRESSURE: 115 MMHG | DIASTOLIC BLOOD PRESSURE: 69 MMHG | HEART RATE: 77 BPM | TEMPERATURE: 98.3 F | HEIGHT: 66 IN | WEIGHT: 194 LBS | BODY MASS INDEX: 31.18 KG/M2 | RESPIRATION RATE: 18 BRPM | OXYGEN SATURATION: 97 %

## 2018-11-29 PROBLEM — O35.8XX0 ECHOGENIC FOCUS OF HEART OF FETUS AFFECTING ANTEPARTUM CARE OF MOTHER: Status: RESOLVED | Noted: 2018-07-18 | Resolved: 2018-11-29

## 2018-11-29 PROBLEM — O22.41: Status: RESOLVED | Noted: 2018-05-22 | Resolved: 2018-11-29

## 2018-11-29 PROBLEM — O36.8390 FETAL ARRHYTHMIA AFFECTING PREGNANCY, ANTEPARTUM: Status: RESOLVED | Noted: 2018-10-25 | Resolved: 2018-11-29

## 2018-11-29 PROBLEM — O99.891 BACK PAIN AFFECTING PREGNANCY IN SECOND TRIMESTER: Status: RESOLVED | Noted: 2018-08-20 | Resolved: 2018-11-29

## 2018-11-29 PROBLEM — Z3A.39 39 WEEKS GESTATION OF PREGNANCY: Status: RESOLVED | Noted: 2018-11-06 | Resolved: 2018-11-29

## 2018-11-29 PROBLEM — F41.9 ANXIETY DURING PREGNANCY, ANTEPARTUM: Status: RESOLVED | Noted: 2018-05-22 | Resolved: 2018-11-29

## 2018-11-29 PROBLEM — O99.340 ANXIETY DURING PREGNANCY, ANTEPARTUM: Status: RESOLVED | Noted: 2018-05-22 | Resolved: 2018-11-29

## 2018-11-29 PROBLEM — M54.9 BACK PAIN AFFECTING PREGNANCY IN SECOND TRIMESTER: Status: RESOLVED | Noted: 2018-08-20 | Resolved: 2018-11-29

## 2018-11-29 PROBLEM — R82.71 GBS BACTERIURIA: Status: RESOLVED | Noted: 2018-09-10 | Resolved: 2018-11-29

## 2018-11-29 PROBLEM — O36.63X0 EXCESSIVE FETAL GROWTH AFFECTING MANAGEMENT OF PREGNANCY IN THIRD TRIMESTER: Status: RESOLVED | Noted: 2018-11-26 | Resolved: 2018-11-29

## 2018-11-29 PROBLEM — Z34.93 SUPERVISION OF LOW-RISK PREGNANCY, THIRD TRIMESTER: Status: RESOLVED | Noted: 2018-05-22 | Resolved: 2018-11-29

## 2018-11-29 PROCEDURE — 90707 MMR VACCINE SC: CPT | Performed by: OBSTETRICS & GYNECOLOGY

## 2018-11-29 RX ORDER — IBUPROFEN 600 MG/1
600 TABLET ORAL EVERY 6 HOURS PRN
Qty: 30 TABLET | Refills: 0
Start: 2018-11-29 | End: 2021-08-24 | Stop reason: SDUPTHER

## 2018-11-29 RX ORDER — DOCUSATE SODIUM 100 MG/1
100 CAPSULE, LIQUID FILLED ORAL 2 TIMES DAILY
Qty: 10 CAPSULE | Refills: 0
Start: 2018-11-29 | End: 2018-12-28 | Stop reason: ALTCHOICE

## 2018-11-29 RX ADMIN — IBUPROFEN 600 MG: 600 TABLET ORAL at 03:23

## 2018-11-29 RX ADMIN — DOCUSATE SODIUM 100 MG: 100 CAPSULE, LIQUID FILLED ORAL at 10:22

## 2018-11-29 RX ADMIN — MEASLES, MUMPS, AND RUBELLA VIRUS VACCINE LIVE 0.5 ML: 1000; 12500; 1000 INJECTION, POWDER, LYOPHILIZED, FOR SUSPENSION SUBCUTANEOUS at 10:23

## 2018-11-29 NOTE — PROGRESS NOTES
POSTPARTUM NOTE  Dominik Bullock 29 y o  female MRN: [id number]  Unit/Bed#: -01 Encounter: [id number]    Date: 11/29/18  Procedure: Spontaneous Vaginal Delivery  Postpartum #: 2     SUBJECTIVE:  Pain: no  Tolerating Oral Intake: yes   Voiding: yes  Flatus: yes  Bowel Movement: yes  Ambulating: yes  Breastfeeding: yes  Chest Pain: no  Shortness of Breath: no  Leg Pain/Discomfort: no  Lochia: minimal  Other: Patient doing well, states that she has no complaints  No acute events overnight       OBJECTIVE:   Vitals: Temp:  [98 °F (36 7 °C)-98 3 °F (36 8 °C)] 98 2 °F (36 8 °C)  HR:  [80-85] 85  Resp:  [18] 18  BP: (117-136)/(75-78) 134/77  General: No Acute Distress, AA&O   Cardiovascular: Regular, Rate and Rhythm, no murmur, normal S1/S2   Lungs: Clear to Auscultation Bilaterally, no wheezing, non-labored breathing   Abdomen: Soft, non-distended, non-tender, no rebound, guarding or CVA tenderness   Fundus: Firm & Non-Tender , Fundal Location: -1 cm below the umbilicus  Lower Extremities: Non-tender, Edema Minimal    LABS / TESTS / MEDICATION:    Recent Results (from the past 72 hour(s))   Type and screen    Collection Time: 11/26/18 10:06 PM   Result Value Ref Range    ABO Grouping B     Rh Factor Positive     Antibody Screen Negative     Specimen Expiration Date [id number]    CBC    Collection Time: 11/26/18 10:06 PM   Result Value Ref Range    WBC 10 63 (H) 4 31 - 10 16 Thousand/uL    RBC 4 02 3 81 - 5 12 Million/uL    Hemoglobin 12 3 11 5 - 15 4 g/dL    Hematocrit 37 1 34 8 - 46 1 %    MCV 92 82 - 98 fL    MCH 30 6 26 8 - 34 3 pg    MCHC 33 2 31 4 - 37 4 g/dL    RDW 14 2 11 6 - 15 1 %    Platelets 469 759 - 190 Thousands/uL    MPV 10 8 8 9 - 12 7 fL   RPR    Collection Time: 11/26/18 10:06 PM   Result Value Ref Range    RPR Non-Reactive Non-Reactive   Blood gas, arterial, cord    Collection Time: 11/27/18 10:04 AM   Result Value Ref Range    pH, Cord Art 7 359 7 230 - 7 430    pCO2, Cord Art 42 1 30 0 - 60 0    pO2, Cord Art 35 9 (H) 5 0 - 25 0 mm HG    HCO3, Cord Art 23 2 17 3 - 27 3 mmol/L    Base Exc, Cord Art -2 2 (L) 3 0 - 11 0 mmol/L    O2 Content, Cord Art 15 8 ml/dl    O2 Hgb, Arterial Cord 76 1 %   Blood gas, venous, cord    Collection Time: 11/27/18 10:04 AM   Result Value Ref Range    pH, Cord Andrew 7 275 7 190 - 7 490    pCO2, Cord Andrew 55 5 (H) 27 0 - 43 0 mm HG    pO2, Cord Andrew 22 3 15 0 - 45 0 mm HG    HCO3, Cord Andrew 25 2 12 2 - 28 6 mmol/L    Base Exc, Cord Andrew -2 6 (L) 1 0 - 9 0 mmol/L    O2 Cont, Cord Andrew 9 6 mL/dL    O2 HGB,VENOUS CORD 43 8 %         docusate sodium 100 mg Oral BID   measles-mumps-rubella 0 5 mL Subcutaneous Once       acetaminophen 650 mg Q6H PRN   benzocaine-menthol-lanolin-aloe  Daily PRN   calcium carbonate 1,000 mg Daily PRN   diphenhydrAMINE 25 mg Q6H PRN   hydrocortisone 1 application PRN   ibuprofen 600 mg Q6H PRN   ondansetron 4 mg Q8H PRN   oxyCODONE-acetaminophen 1 tablet Q4H PRN   oxyCODONE-acetaminophen 2 tablet Q4H PRN   witch hazel-glycerin 1 pad PRN       ASSESSMENT:   29 y o  [id number] s/p Spontaneous Vaginal Delivery Postpartum day  2  No acute events, patient doing well       PLAN:  1) Delivery: Continue routine postpartum care, encourage ambulation, encourage breast feeding,  advance diet as tolerated  2) Planned contraception: condoms   3) Expected discharge today     Signature / Title: Krissy Hernández MD, Family Medicine  Date: 11/29/2018  Time: 6:06 AM

## 2018-11-29 NOTE — PLAN OF CARE
DISCHARGE PLANNING     Discharge to home or other facility with appropriate resources Completed        INFECTION - ADULT     Absence or prevention of progression during hospitalization Completed     Absence of fever/infection during neutropenic period Completed        PAIN - ADULT     Verbalizes/displays adequate comfort level or baseline comfort level Completed        POSTPARTUM     Experiences normal postpartum course Completed     Appropriate maternal -  bonding Completed     Establishment of infant feeding pattern Completed     Incision(s), wounds(s) or drain site(s) healing without S/S of infection Completed        SAFETY ADULT     Patient will remain free of falls Completed     Maintain or return to baseline ADL function Completed     Maintain or return mobility status to optimal level Completed

## 2018-12-28 ENCOUNTER — POSTPARTUM VISIT (OUTPATIENT)
Dept: OBGYN CLINIC | Facility: CLINIC | Age: 28
End: 2018-12-28

## 2018-12-28 VITALS — SYSTOLIC BLOOD PRESSURE: 110 MMHG | DIASTOLIC BLOOD PRESSURE: 64 MMHG | BODY MASS INDEX: 27.92 KG/M2 | WEIGHT: 173 LBS

## 2018-12-28 PROCEDURE — 99024 POSTOP FOLLOW-UP VISIT: CPT | Performed by: PHYSICIAN ASSISTANT

## 2018-12-28 NOTE — PROGRESS NOTES
Augusta Harding  1990    S:  29 y o  female here for postpartum visit  She is s/p vaginal delivery on 11/27/18  Gender: male  Apgars: 9,9  Weight: 3799g  Her lochia has resolved  She is Breastfeeding without problems  She denies postpartum blues/depression  Last Pap: 2017 neg    We discussed contraceptive options in detail including birth control pills, patches, NuvaRing, DepoProvera, Mirena/Kyleena IUD, Nexplanon in detail  At this point she would like to use condoms  Past Medical History:   Diagnosis Date    Acne     was on Amoxicillin d/c once she found out she was pregnant    Angioma     developed during pregnancy    Asthma     exercise induced asthma no current inhaler    Migraine     Normal delivery     2016 daughter    Varicella     ?      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1978 [employer]     Social History Main Topics    Smoking status: Never Smoker    Smokeless tobacco: Never Used    Alcohol use No      Comment: social alcohol use per allscripts    Drug use: No    Sexual activity: Yes     Partners: Male     Other Topics Concern    None     Social History Narrative    Always uses seat belt    Daily caffeinated coffee consumption    Exercises regularly           O:   /64 (BP Location: Right arm, Patient Position: Sitting, Cuff Size: Standard)   Wt 78 5 kg (173 lb)   LMP 01/28/2018 (Exact Date)   BMI 27 92 kg/m²     She appears well and in no distress  Abdomen is soft and nontender  External genitals are normal  Vagina is normal  Cervix, uterus and adnexa are nontender, no masses palpable  A/P:  Postpartum visit  Contraception -  condoms    Pap due 2020  She will return in 6 months for her yearly exam, sooner PRN

## 2019-01-08 ENCOUNTER — TELEPHONE (OUTPATIENT)
Dept: POSTPARTUM | Facility: CLINIC | Age: 29
End: 2019-01-08

## 2019-01-08 NOTE — TELEPHONE ENCOUNTER
Macho Cisneros thinks she has a cold with low grade fever  "I can produce 8-10 ounces at a pumping session, so I'm not worried about my supply drying up  I do not have any milk supply stored, yet, but I will  I just need to have a break from this cold so I can feel better  It is mostly nasal congestion, not coughing "    Encouraged Macho Cisneros to consider nasal gavages as they are have less impact on breast milk production/supply  Discussed Pseudoephedrine L3 category which has a half life of 9-12 hours and can make infants irritable and/deminish milk supply  Encouraged Estrella to watch for weight loss in baby if she takes it for longer periods of time  Provided information on what the L categories mean and from what book I was giving information: Medications & Mothers' Milk by Natacha Acevedo, PhD Meliton Ware to call with any other questions/concerns she may have

## 2019-04-02 ENCOUNTER — ANNUAL EXAM (OUTPATIENT)
Dept: OBGYN CLINIC | Facility: CLINIC | Age: 29
End: 2019-04-02
Payer: COMMERCIAL

## 2019-04-02 VITALS
WEIGHT: 170 LBS | DIASTOLIC BLOOD PRESSURE: 64 MMHG | HEIGHT: 66 IN | BODY MASS INDEX: 27.32 KG/M2 | SYSTOLIC BLOOD PRESSURE: 100 MMHG

## 2019-04-02 DIAGNOSIS — F41.9 ANXIETY: ICD-10-CM

## 2019-04-02 DIAGNOSIS — Z01.419 ENCNTR FOR GYN EXAM (GENERAL) (ROUTINE) W/O ABN FINDINGS: Primary | ICD-10-CM

## 2019-04-02 PROCEDURE — S0612 ANNUAL GYNECOLOGICAL EXAMINA: HCPCS | Performed by: PHYSICIAN ASSISTANT

## 2019-04-02 RX ORDER — CITALOPRAM 20 MG/1
20 TABLET ORAL DAILY
Qty: 90 TABLET | Refills: 1 | Status: SHIPPED | OUTPATIENT
Start: 2019-04-02 | End: 2019-10-04 | Stop reason: SDUPTHER

## 2019-04-19 ENCOUNTER — TELEPHONE (OUTPATIENT)
Dept: OBGYN CLINIC | Facility: CLINIC | Age: 29
End: 2019-04-19

## 2019-09-19 ENCOUNTER — TELEPHONE (OUTPATIENT)
Dept: OBGYN CLINIC | Facility: CLINIC | Age: 29
End: 2019-09-19

## 2019-09-19 NOTE — TELEPHONE ENCOUNTER
Pt is not sure if she needs to make an apt or if a refill of her anti-anxiety meds can be called in  She has 16 left but said bottle said no refills

## 2019-10-04 ENCOUNTER — OFFICE VISIT (OUTPATIENT)
Dept: OBGYN CLINIC | Facility: CLINIC | Age: 29
End: 2019-10-04
Payer: COMMERCIAL

## 2019-10-04 VITALS — BODY MASS INDEX: 28.89 KG/M2 | SYSTOLIC BLOOD PRESSURE: 120 MMHG | WEIGHT: 179 LBS | DIASTOLIC BLOOD PRESSURE: 72 MMHG

## 2019-10-04 DIAGNOSIS — F41.9 ANXIETY: Primary | ICD-10-CM

## 2019-10-04 PROCEDURE — 99213 OFFICE O/P EST LOW 20 MIN: CPT | Performed by: PHYSICIAN ASSISTANT

## 2019-10-04 RX ORDER — CITALOPRAM 20 MG/1
20 TABLET ORAL DAILY
Qty: 90 TABLET | Refills: 3 | Status: SHIPPED | OUTPATIENT
Start: 2019-10-04 | End: 2020-04-07 | Stop reason: SDUPTHER

## 2019-10-04 NOTE — PROGRESS NOTES
Mary Okeefe  1990    S:  34 y o  female here for follow-up of her postpartum anxiety  She is s/p vaginal delivery 11/2018  She has been maintained on Celexa 20mg po daily since April 2019 and reports she is feeling so much better than she did after the delivery of her daughter  She says she is actually a little afraid to stop it  We discussed the fact that she is doing so well, and the option to continue her Celexa for now - she was relieved to know that she could continue this  She had had a stressful few months; her mother had open heart surgery for an aortic valve replacement, and her son recently had a rash (which is now felt to be viral)  Things are now improving  Past Medical History:   Diagnosis Date    Acne     was on Amoxicillin d/c once she found out she was pregnant    Angioma     developed during pregnancy    Asthma     exercise induced asthma no current inhaler    Migraine     Normal delivery     2016 daughter    Varicella     ?      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1978 [employer] [address] Needs    Financial resource strain: None    Food insecurity:     Worry: None     Inability: None    Transportation needs:     Medical: None     Non-medical: None   Tobacco Use    Smoking status: Never Smoker    Smokeless tobacco: Never Used   Substance and Sexual Activity    Alcohol use: No     Comment: social alcohol use per allscripts    Drug use: No    Sexual activity: Yes     Partners: Male   Lifestyle    Physical activity:     Days per week: None     Minutes per session: None    Stress: None   Relationships    Social connections:     Talks on phone: None     Gets together: None     Attends Jainism service: None     Active member of club or organization: None     Attends meetings of clubs or organizations: None     Relationship status: None    Intimate partner violence:     Fear of current or ex partner: None     Emotionally abused: None     Physically abused: None     Forced sexual activity: None   Other Topics Concern    None   Social History Narrative    Always uses seat belt    Daily caffeinated coffee consumption    Exercises regularly       Review of Systems   Respiratory: Negative  Cardiovascular: Negative  Gastrointestinal: Negative for constipation and diarrhea  Genitourinary: Negative for difficulty urinating, pelvic pain, vaginal bleeding, vaginal discharge, itching or odor  O:  /72 (BP Location: Right arm, Patient Position: Sitting, Cuff Size: Standard)   Wt 81 2 kg (179 lb)   BMI 28 89 kg/m²   She appears well and is in no distress  Abdomen is soft and nontender    A/P: Anxiety  Continue Celexa 20mg po daily  Call with any issues  Return for her yearly exam as scheduled

## 2019-11-26 ENCOUNTER — OFFICE VISIT (OUTPATIENT)
Dept: URGENT CARE | Facility: MEDICAL CENTER | Age: 29
End: 2019-11-26
Payer: COMMERCIAL

## 2019-11-26 VITALS
DIASTOLIC BLOOD PRESSURE: 74 MMHG | OXYGEN SATURATION: 98 % | HEART RATE: 92 BPM | HEIGHT: 66 IN | WEIGHT: 165 LBS | TEMPERATURE: 98.3 F | BODY MASS INDEX: 26.52 KG/M2 | RESPIRATION RATE: 16 BRPM | SYSTOLIC BLOOD PRESSURE: 110 MMHG

## 2019-11-26 DIAGNOSIS — J02.0 STREP PHARYNGITIS: ICD-10-CM

## 2019-11-26 DIAGNOSIS — J02.9 SORE THROAT: Primary | ICD-10-CM

## 2019-11-26 LAB — S PYO AG THROAT QL: POSITIVE

## 2019-11-26 PROCEDURE — 87880 STREP A ASSAY W/OPTIC: CPT | Performed by: PHYSICIAN ASSISTANT

## 2019-11-26 PROCEDURE — 99213 OFFICE O/P EST LOW 20 MIN: CPT | Performed by: PHYSICIAN ASSISTANT

## 2019-11-26 RX ORDER — AMOXICILLIN 500 MG/1
500 TABLET, FILM COATED ORAL 2 TIMES DAILY
Qty: 20 TABLET | Refills: 0 | Status: SHIPPED | OUTPATIENT
Start: 2019-11-26 | End: 2019-12-06

## 2019-11-26 NOTE — PROGRESS NOTES
[facility] Now        NAME: Landon Toscano is a 34 y o  female  : 1990    MRN: [id number]  DATE: 2019  TIME: 11:45 AM    Assessment and Plan   Sore throat [J02 9]  1  Sore throat  POCT rapid strepA   2  Strep pharyngitis  amoxicillin (AMOXIL) 500 MG tablet         Patient Instructions     Begin taking Amoxicillin as directed  Take with yogurt or probiotic to prevent stomach upset  Continue with hand hygiene to prevent spread of infection  Continue with plenty of fluids, rest, and take over-the-counter pain relief as needed  Follow up with PCP in 3-5 days  Proceed to  ER if symptoms worsen  Chief Complaint     Chief Complaint   Patient presents with    Sore Throat     Sore throat x 2 days with low grade fever  Tyenol today  History of Present Illness       Pt  Is a 34year old female with no significant past medical history who presents to the office with sore throat low grade fever x 2 days  She describes her pain as a burning sensation that is made worse by swallowing  The pain in the throat is generalized and is not worse on one side or another  She is still able to tolerate eating both liquids and solids with some discomfort  She has been taking acetaminophen needed for pain as well as using warm liquids for relief  These treatments have been working well  She notes low grade fevers since the symptoms started with a tmax of 99 4 this morning  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  There has been no recent changes in her prescribed medications and she denies any drug allergies  She does notes that she is currently breastfeeding  Review of Systems   Review of Systems   Constitutional: Positive for fever (tmax 99 4)  Negative for chills and fatigue  HENT: Positive for sore throat (burning) and trouble swallowing (due to pain )   Negative for congestion, ear pain, postnasal drip, rhinorrhea, sinus pressure, sinus pain and voice change  Eyes: Negative for pain and redness  Respiratory: Negative for cough, choking, shortness of breath and wheezing  Gastrointestinal: Negative for abdominal pain, blood in stool, constipation, diarrhea, nausea and vomiting  Genitourinary: Negative for difficulty urinating, dysuria, flank pain and hematuria  Musculoskeletal: Negative  Negative for arthralgias and myalgias  Skin: Negative for color change and rash  Neurological: Negative for headaches  Current Medications       Current Outpatient Medications:     amoxicillin (AMOXIL) 500 MG tablet, Take 1 tablet (500 mg total) by mouth 2 (two) times a day for 10 days, Disp: 20 tablet, Rfl: 0    citalopram (CeleXA) 20 mg tablet, Take 1 tablet (20 mg total) by mouth daily, Disp: 90 tablet, Rfl: 3    ibuprofen (MOTRIN) 600 mg tablet, Take 1 tablet (600 mg total) by mouth every 6 (six) hours as needed for mild pain, Disp: 30 tablet, Rfl: 0    Prenatal Multivit-Min-Fe-FA (PRENATAL #2 PO), Take 1 tablet by mouth daily  , Disp: , Rfl:     Current Allergies     Allergies as of 11/26/2019 - Reviewed 11/26/2019   Allergen Reaction Noted    Pollen extract Sneezing 08/14/2018            The following portions of the patient's history were reviewed and updated as appropriate: allergies, current medications, past family history, past medical history, past social history, past surgical history and problem list      Past Medical History:   Diagnosis Date    Acne     was on Amoxicillin d/c once she found out she was pregnant    Angioma     developed during pregnancy    Asthma     exercise induced asthma no current inhaler    Migraine     Normal delivery     2016 daughter   [place] Varicella     ?        Past Surgical History:   Procedure Laterality Date    TOOTH EXTRACTION         Family History   Problem Relation Age of Onset    Cancer Mother         hodgkins lymphome    No Known Problems Father     No Known Problems Sister     No Known Problems Brother     Cancer Maternal Grandmother         pancreatic    Heart disease Paternal Grandfather         CHF    No Known Problems Daughter          Medications have been verified  Objective   /74   Pulse 92   Temp 98 3 °F (36 8 °C)   Resp 16   Ht 5' 6" (1 676 m)   Wt 74 8 kg (165 lb)   SpO2 98%   BMI 26 63 kg/m²        Physical Exam     Physical Exam   Constitutional: She appears well-developed and well-nourished  HENT:   Head: Normocephalic and atraumatic  Right Ear: Tympanic membrane and ear canal normal  No drainage or tenderness  No middle ear effusion  Left Ear: Tympanic membrane and ear canal normal  No drainage or tenderness  No middle ear effusion  Mouth/Throat: Uvula is midline and mucous membranes are normal  No uvula swelling  No tonsillar abscesses  No tonsillar exudate  Eyes: Pupils are equal, round, and reactive to light  Cardiovascular: Normal rate, regular rhythm and normal heart sounds  No murmur heard  Pulmonary/Chest: Effort normal and breath sounds normal  No respiratory distress  She has no wheezes  She has no rhonchi  She has no rales  Abdominal: Soft  Bowel sounds are normal  There is no hepatosplenomegaly  Neurological: She is alert  Skin: Skin is warm and dry  Psychiatric: She has a normal mood and affect  Her behavior is normal            Assessment/ Plan  1  Strep Pharyngitis   -POCT strepA positive   -  Amxicillin 500mg PO BID x 10 days    -Continue with hand hygiene to prevent spread of infection   -Continue with plenty of fluids, rest, and take over-the-counter pain relief as needed    -Follow up with PCP in 3-5 days    -Proceed to  ER if symptoms worsen

## 2019-11-26 NOTE — PATIENT INSTRUCTIONS
Begin taking Amoxicillin as directed  Take with yogurt or probiotic to prevent stomach upset  Continue with hand hygiene to prevent spread of infection  Continue with plenty of fluids, rest, and take over-the-counter pain relief as needed  Follow up with PCP in 3-5 days  Proceed to  ER if symptoms worsen  Strep Throat   WHAT YOU NEED TO KNOW:   Strep throat is a throat infection caused by bacteria  It is easily spread from person to person  DISCHARGE INSTRUCTIONS:   Call 911 for any of the following:   · You have trouble breathing  Return to the emergency department if:   · You have new symptoms like a bad headache, stiff neck, chest pain, or vomiting  · You are drooling because you cannot swallow your spit  Contact your healthcare provider if:   · You have a fever  · You have a rash or ear pain  · You have green, yellow-brown, or bloody mucus when you cough or blow your nose  · You are unable to drink anything  · You have questions or concerns about your condition or care  Medicines:   · Antibiotics  help treat your strep throat  You should feel better within 2 to 3 days after you start antibiotics  · Take your medicine as directed  Contact your healthcare provider if you think your medicine is not helping or if you have side effects  Tell him or her if you are allergic to any medicine  Keep a list of the medicines, vitamins, and herbs you take  Include the amounts, and when and why you take them  Bring the list or the pill bottles to follow-up visits  Carry your medicine list with you in case of an emergency  Manage your symptoms:   · Use lozenges, ice, soft foods, or popsicles  to soothe your throat  · Drink juice, milk shakes, or soup  if your throat is too sore to eat solid food  Drinking liquids can also help prevent dehydration  · Gargle with salt water  Mix ¼ teaspoon salt in a glass of warm water and gargle  This may help reduce swelling in your throat      · Do not smoke  Nicotine and other chemicals in cigarettes and cigars can cause lung damage and make your symptoms worse  Ask your healthcare provider for information if you currently smoke and need help to quit  E-cigarettes or smokeless tobacco still contain nicotine  Talk to your healthcare provider before you use these products  Return to work or school  24 hours after you start antibiotic medicine  Prevent the spread of strep throat:   · Wash your hands often  Use soap and water  Wash your hands after you use the bathroom, change a child's diapers, or sneeze  Wash your hands before you prepare or eat food  · Do not share food or drinks  Replace your toothbrush after you have taken antibiotics for 24 hours  Follow up with your healthcare provider as directed:  Write down your questions so you remember to ask them during your visits  © 2017 [employer] Information is for End User's use only and may not be sold, redistributed or otherwise used for commercial purposes  All illustrations and images included in CareNotes® are the copyrighted property of [employer]  or Gustavo Starr  The above information is an  only  It is not intended as medical advice for individual conditions or treatments  Talk to your doctor, nurse or pharmacist before following any medical regimen to see if it is safe and effective for you

## 2019-11-26 NOTE — LETTER
November 26, 2019     Patient: Nicanor Traore   YOB: 1990   Date of Visit: 11/26/2019       To Whom It May Concern: It is my medical opinion that Viviane Ruiz may return to work on 11/29/2019  If you have any questions or concerns, please don't hesitate to call           Sincerely,        Rosina Carrasco PA-C    CC: No Recipients

## 2019-12-10 ENCOUNTER — OFFICE VISIT (OUTPATIENT)
Dept: URGENT CARE | Facility: MEDICAL CENTER | Age: 29
End: 2019-12-10
Payer: COMMERCIAL

## 2019-12-10 VITALS
TEMPERATURE: 97.2 F | RESPIRATION RATE: 16 BRPM | BODY MASS INDEX: 26.52 KG/M2 | WEIGHT: 165 LBS | OXYGEN SATURATION: 99 % | HEIGHT: 66 IN | DIASTOLIC BLOOD PRESSURE: 60 MMHG | HEART RATE: 80 BPM | SYSTOLIC BLOOD PRESSURE: 108 MMHG

## 2019-12-10 DIAGNOSIS — J02.9 SORE THROAT: ICD-10-CM

## 2019-12-10 DIAGNOSIS — J02.0 STREP PHARYNGITIS: Primary | ICD-10-CM

## 2019-12-10 LAB — S PYO AG THROAT QL: POSITIVE

## 2019-12-10 PROCEDURE — 87070 CULTURE OTHR SPECIMN AEROBIC: CPT | Performed by: PHYSICIAN ASSISTANT

## 2019-12-10 PROCEDURE — 99213 OFFICE O/P EST LOW 20 MIN: CPT | Performed by: PHYSICIAN ASSISTANT

## 2019-12-10 PROCEDURE — 87147 CULTURE TYPE IMMUNOLOGIC: CPT | Performed by: PHYSICIAN ASSISTANT

## 2019-12-10 RX ORDER — AMOXICILLIN AND CLAVULANATE POTASSIUM 875; 125 MG/1; MG/1
1 TABLET, FILM COATED ORAL EVERY 12 HOURS SCHEDULED
Qty: 20 TABLET | Refills: 0 | Status: SHIPPED | OUTPATIENT
Start: 2019-12-10 | End: 2019-12-20

## 2019-12-10 NOTE — PROGRESS NOTES
[facility]        NAME: Keith Fierro is a 34 y o  female  : 1990    MRN: [id number]  DATE: December 10, 2019  TIME: 5:09 PM    Assessment and Plan   Strep pharyngitis [J02 0]  1  Strep pharyngitis  amoxicillin-clavulanate (AUGMENTIN) 875-125 mg per tablet   2  Sore throat  POCT rapid strepA     Assessment/Plan:  1  Strep pharyngitis  - Informed pt that she has strep throat  Rapid strep was positive in office  Will still send out for culture  Will call patient if antibiotic needs to be switched  Will treat with Augmentin, 875-125mg, 2x a day, for 10 days  Take with probiotics or yogurt to prevent yeast infection  Augmentin is safe during breast feeding  Get plenty of fluids and rest  Tylenol or Advil for fevers  Follow up with your PCP if symptoms do not resolve  Go to the ER if symptoms become severe  Patient Instructions     Follow up with PCP in 3-5 days  Proceed to  ER if symptoms worsen  Chief Complaint     Chief Complaint   Patient presents with    Sore Throat     Pt treated for strep throat and completed her abx   Sore throat symptoms have returned  History of Present Illness       Pt is a 34 y o female presenting with sore throat x 3 days  Pt states she was seen here in urgent care on 19 for strep throat  Pt was treated with Amoxicillin  Pt took the whole course of antibiotics and was feeling better until 3 days ago when she started having sore throat symptoms again  Pt denies fevers, chills, cough, chest pain, SOB, N/V/D  Pt is currently nursing  Pt's  was also seen in office for similar symptoms  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  Review of Systems   Review of Systems   Constitutional: Negative for activity change, appetite change, chills, diaphoresis, fatigue and fever  HENT: Positive for sore throat   Negative for congestion, ear discharge, ear pain, hearing loss, postnasal drip, rhinorrhea, sinus pressure, sinus pain and trouble swallowing  Eyes: Negative for pain, discharge, redness and itching  Respiratory: Negative for cough, chest tightness, shortness of breath, wheezing and stridor  Cardiovascular: Negative for chest pain, palpitations and leg swelling  Gastrointestinal: Negative for abdominal distention, abdominal pain, diarrhea, nausea and vomiting  Musculoskeletal: Negative for arthralgias, myalgias, neck pain and neck stiffness  Skin: Negative for color change, pallor and rash  Neurological: Negative for dizziness, syncope, weakness, light-headedness, numbness and headaches  Current Medications       Current Outpatient Medications:     amoxicillin-clavulanate (AUGMENTIN) 875-125 mg per tablet, Take 1 tablet by mouth every 12 (twelve) hours for 10 days, Disp: 20 tablet, Rfl: 0    citalopram (CeleXA) 20 mg tablet, Take 1 tablet (20 mg total) by mouth daily, Disp: 90 tablet, Rfl: 3    ibuprofen (MOTRIN) 600 mg tablet, Take 1 tablet (600 mg total) by mouth every 6 (six) hours as needed for mild pain, Disp: 30 tablet, Rfl: 0    Prenatal Multivit-Min-Fe-FA (PRENATAL #2 PO), Take 1 tablet by mouth daily  , Disp: , Rfl:     Current Allergies     Allergies as of 12/10/2019 - Reviewed 12/10/2019   Allergen Reaction Noted    Pollen extract Sneezing 08/14/2018            The following portions of the patient's history were reviewed and updated as appropriate: allergies, current medications, past family history, past medical history, past social history, past surgical history and problem list      Past Medical History:   Diagnosis Date    Acne     was on Amoxicillin d/c once she found out she was pregnant    Angioma     developed during pregnancy    Asthma     exercise induced asthma no current inhaler    Migraine     Normal delivery     2016 daughter   Ethe[place] Varicella     ?        Past Surgical History:   Procedure Laterality Date    TOOTH EXTRACTION         Family History   Problem Relation Age of Onset    Cancer Mother         hodgkins lymphome    No Known Problems Father     No Known Problems Sister     No Known Problems Brother     Cancer Maternal Grandmother         pancreatic    Heart disease Paternal Grandfather         CHF    No Known Problems Daughter          Medications have been verified  Objective   /60   Pulse 80   Temp (!) 97 2 °F (36 2 °C)   Resp 16   Ht 5' 6" (1 676 m)   Wt 74 8 kg (165 lb)   SpO2 99%   BMI 26 63 kg/m²        Physical Exam     Physical Exam   Constitutional: She appears well-developed and well-nourished  Non-toxic appearance  She does not appear ill  No distress  HENT:   Head: Normocephalic and atraumatic  Right Ear: Hearing, tympanic membrane and ear canal normal  No drainage, swelling or tenderness  No middle ear effusion  Left Ear: Hearing, tympanic membrane and ear canal normal  No drainage, swelling or tenderness  No middle ear effusion  Mouth/Throat: Mucous membranes are normal  Mucous membranes are not pale, not dry and not cyanotic  Oropharyngeal exudate, posterior oropharyngeal edema and posterior oropharyngeal erythema present  No tonsillar abscesses  Tonsils are 3+ on the right  Tonsils are 3+ on the left  Tonsillar exudate  Neck: Normal range of motion  No thyromegaly present  Cardiovascular: Normal rate, regular rhythm and normal heart sounds  Exam reveals no friction rub  No murmur heard  Pulmonary/Chest: Effort normal and breath sounds normal  No stridor  No respiratory distress  She has no wheezes  She has no rhonchi  She has no rales  She exhibits no tenderness  Lymphadenopathy:     She has no cervical adenopathy  Skin: Skin is warm and dry  No rash noted  She is not diaphoretic  No erythema  Nursing note and vitals reviewed

## 2019-12-10 NOTE — PATIENT INSTRUCTIONS
Take Augmentin as prescribed  Take with probiotics or yogurt to prevent yeast infection  Augmentin is safe during breast feeding  Get plenty of fluids and rest  Tylenol or Advil for fevers  Follow up with your PCP if symptoms do not resolve  Go to the ER if symptoms become severe  Will send strep for culture to see what it is growing  Will contact you if antibiotics prescribed need to be changed  Strep Throat   WHAT YOU NEED TO KNOW:   Strep throat is a throat infection caused by bacteria  It is easily spread from person to person  DISCHARGE INSTRUCTIONS:   Call 911 for any of the following:   · You have trouble breathing  Return to the emergency department if:   · You have new symptoms like a bad headache, stiff neck, chest pain, or vomiting  · You are drooling because you cannot swallow your spit  Contact your healthcare provider if:   · You have a fever  · You have a rash or ear pain  · You have green, yellow-brown, or bloody mucus when you cough or blow your nose  · You are unable to drink anything  · You have questions or concerns about your condition or care  Medicines:   · Antibiotics  help treat your strep throat  You should feel better within 2 to 3 days after you start antibiotics  · Take your medicine as directed  Contact your healthcare provider if you think your medicine is not helping or if you have side effects  Tell him or her if you are allergic to any medicine  Keep a list of the medicines, vitamins, and herbs you take  Include the amounts, and when and why you take them  Bring the list or the pill bottles to follow-up visits  Carry your medicine list with you in case of an emergency  Manage your symptoms:   · Use lozenges, ice, soft foods, or popsicles  to soothe your throat  · Drink juice, milk shakes, or soup  if your throat is too sore to eat solid food  Drinking liquids can also help prevent dehydration  · Gargle with salt water    Mix ¼ teaspoon salt in a glass of warm water and gargle  This may help reduce swelling in your throat  · Do not smoke  Nicotine and other chemicals in cigarettes and cigars can cause lung damage and make your symptoms worse  Ask your healthcare provider for information if you currently smoke and need help to quit  E-cigarettes or smokeless tobacco still contain nicotine  Talk to your healthcare provider before you use these products  Return to work or school  24 hours after you start antibiotic medicine  Prevent the spread of strep throat:   · Wash your hands often  Use soap and water  Wash your hands after you use the bathroom, change a child's diapers, or sneeze  Wash your hands before you prepare or eat food  · Do not share food or drinks  Replace your toothbrush after you have taken antibiotics for 24 hours  Follow up with your healthcare provider as directed:  Write down your questions so you remember to ask them during your visits  © 2017 [employer] Information is for End User's use only and may not be sold, redistributed or otherwise used for commercial purposes  All illustrations and images included in CareNotes® are the copyrighted property of [employer]  or Gustavo Starr  The above information is an  only  It is not intended as medical advice for individual conditions or treatments  Talk to your doctor, nurse or pharmacist before following any medical regimen to see if it is safe and effective for you

## 2019-12-12 LAB — BACTERIA THROAT CULT: ABNORMAL

## 2020-01-27 ENCOUNTER — OFFICE VISIT (OUTPATIENT)
Dept: URGENT CARE | Facility: CLINIC | Age: 30
End: 2020-01-27
Payer: COMMERCIAL

## 2020-01-27 VITALS
HEART RATE: 76 BPM | DIASTOLIC BLOOD PRESSURE: 62 MMHG | SYSTOLIC BLOOD PRESSURE: 122 MMHG | TEMPERATURE: 98 F | OXYGEN SATURATION: 97 % | RESPIRATION RATE: 18 BRPM

## 2020-01-27 DIAGNOSIS — R21 RASH: Primary | ICD-10-CM

## 2020-01-27 PROCEDURE — 99213 OFFICE O/P EST LOW 20 MIN: CPT | Performed by: NURSE PRACTITIONER

## 2020-01-27 RX ORDER — METHYLPREDNISOLONE 4 MG/1
TABLET ORAL
Qty: 1 EACH | Refills: 0 | Status: SHIPPED | OUTPATIENT
Start: 2020-01-27 | End: 2020-02-10

## 2020-01-27 NOTE — PROGRESS NOTES
[facility]        NAME: Kasia Cortes is a 34 y o  female  : 1990    MRN: [id number]  DATE: 2020  TIME: 6:20 PM    Assessment and Plan   Rash [R21]  1  Rash  methylPREDNISolone 4 MG tablet therapy pack     Rash noted - sporadic -   Does not illuminate with woods lamp  Suspect possible psoriasis   rec trial medrol dose pack and f/u with derm   Pt in agreement with plan     Patient Instructions     Follow up with PCP in 3-5 days  Proceed to  ER if symptoms worsen  Chief Complaint     Chief Complaint   Patient presents with    Rash     Patient has had a rash for 1 5 weeks  History of Present Illness   Kasia Cortes presents to the clinic c/o    Pt states has a rash all over -   Was on amoxil twice over the past 3 months  No one else with a rash   Doesn't itch   sporatic spots  On legs, core, arms, back, arms, hands  Never had anything like this before  No family h/o psoriasis or auto immune disorders  Review of Systems   Review of Systems   All other systems reviewed and are negative          Current Medications     Long-Term Medications   Medication Sig Dispense Refill    citalopram (CeleXA) 20 mg tablet Take 1 tablet (20 mg total) by mouth daily 90 tablet 3    ibuprofen (MOTRIN) 600 mg tablet Take 1 tablet (600 mg total) by mouth every 6 (six) hours as needed for mild pain 30 tablet 0       Current Allergies     Allergies as of 2020 - Reviewed 12/10/2019   Allergen Reaction Noted    Pollen extract Sneezing 2018            The following portions of the patient's history were reviewed and updated as appropriate: allergies, current medications, past family history, past medical history, past social history, past surgical history and problem list     Objective   /62   Pulse 76   Temp 98 °F (36 7 °C) (Tympanic)   Resp 18   LMP 2020 (Approximate)   SpO2 97%        Physical Exam     Physical Exam   Constitutional: She is oriented to person, place, and time  Vital signs are normal  She appears well-developed and well-nourished  She is active and cooperative  HENT:   Head: Normocephalic and atraumatic  Eyes: Conjunctivae and lids are normal    Cardiovascular: Normal rate, regular rhythm, S1 normal, S2 normal and normal heart sounds  Pulmonary/Chest: Effort normal and breath sounds normal    Neurological: She is alert and oriented to person, place, and time  Skin: Skin is warm and dry  Rash noted  Rash is maculopapular  Small 0 5 cm at the largest red/dry skin lesions  Psychiatric: She has a normal mood and affect  Her speech is normal and behavior is normal  Judgment and thought content normal  Cognition and memory are normal    Nursing note and vitals reviewed

## 2020-01-27 NOTE — PATIENT INSTRUCTIONS
Psoriasis   WHAT YOU NEED TO KNOW:   What is psoriasis? Psoriasis is a long-term skin disease in which the skin cells grow faster than normal  This abnormal growth causes a buildup of cells on the surface of the skin  Red, raised patches that are covered with silver-colored scales form on your skin  What causes psoriasis? The exact cause of psoriasis is not known  A problem with the immune system sometimes causes your body to attack healthy skin cells  Psoriasis is more likely to occur if another family member also has psoriasis  Flare-ups of psoriasis come and go and are often caused by certain triggers  · Infections:  Germs, such as bacteria, viruses, or fungi, may trigger a flare-up  A flare-up of psoriasis usually follows a sore throat  · Medicines:  Certain medicines, such as those used to treat high blood pressure or depression, may trigger a psoriasis flare-up  · Skin damage:  Skin injuries, such as a cut or scrape, or a sunburn may increase your risk of a flare-up  · Smoking and alcohol:  Smoking and drinking alcohol may also increase your risk  · Stress: Both physical and emotional stress may lead to a flare-up of psoriasis  What are the signs and symptoms of psoriasis? The signs and symptoms usually depend on the type of psoriasis you have  · Plaque type: This is the most common and mildest type of psoriasis  Plaques are reddened patches covered with silver-colored scales  Your knees, elbows, scalp, stomach, and back are usually affected  You may also have nail changes, such as pitting, thickening, or lifting of the nails off the nail bed  · Guttate type: This type is the most common among children and young adults  It usually happens after a sore throat or other infections  This type looks like red, raised, pea-sized drops on your skin  · Inverse type: The plaques appear as smooth red patches and are often found in the moist areas of your body   It affects skin in the armpits, groin, under breasts, and around the genitals  · Erythrodermic type: This is a rare and severe type of psoriasis in which plaques cover large areas of the skin  These areas itch and are painful  · Pustular type:  Pustules (blisters with pus inside) or pimple-like lesions may appear on large red areas of the skin  Sometimes this type is limited to the palms of the hands and soles of the feet  · Psoriatic arthritis:  Some people who have psoriasis may also develop psoriatic arthritis  Psoriatic arthritis makes your joints swollen and painful  You may also have nail changes, such as pitting, thickening, or lifting of the nails off the nail bed  How is psoriasis diagnosed? Your healthcare provider will ask about your health history  He may also want to know if you have other family members who have psoriasis  Psoriasis is usually diagnosed after a careful examination of your skin, scalp, and nails  Blood tests and x-rays may also be needed  How is psoriasis treated? Treatment usually depends on the severity of the disease, size of the areas involved, and the type of psoriasis  · Topical medicine: These medicines are ointments, creams, and pastes that are applied on the skin  ¨ Moisturizers: These soothe your skin by keeping it moist and preventing dryness  ¨ Steroids: This medicine may be given to decrease inflammation  ¨ Vitamin D and retinoids: These are vitamin-based creams that are used to clear plaques  ¨ Anthralin:  This medicine decreases swelling and excess skin cells that form scales  ¨ Salicylic acid: This peeling agent helps decrease scaling of the skin and scalp  ¨ Tar preparations: These medicines decrease itching, scaling, and inflammation  They may be shampoos, creams, or bath oils  · Oral medicine: These medicines are used to treat serious types of psoriasis and are taken by mouth  They include steroids or retinoids   They may also include medicines that decrease the rate of growth of your skin cells or that affect your immune system  · Phototherapy:  You may need ultraviolet (UV) light treatments if your psoriasis is severe  Your skin is exposed to UV light for the period of time that your healthcare provider prescribes  What are the risks of psoriasis? Certain medicines used to treat psoriasis can cause burning, redness, and irritation of your skin  They can also cause drowsiness, high blood pressure, or birth defects  Without treatment, your signs and symptoms may worsen  Psoriasis may cause severe itching, swelling, and infection  You may also bleed more easily  How can I manage my psoriasis? · Take care of your skin:  Apply emollients, lubricants, or moisturizing creams to your skin regularly  Apply these while your skin is still damp when you bathe  Stop using them if they sting or irritate your skin  Use mild soaps and add bath oils to soothe your skin when you bathe  · Protect your skin from sun exposure:  When you get sun exposure for short periods of time, it can help your psoriasis  Too much sun exposure or a sunburn can make your psoriasis worse  Talk to your dermatologist or healthcare provider about how much sun exposure is right for you  · Manage stress:  Stress can trigger a flare-up  Find healthy ways to manage stress, such as deep breathing or meditation  · Watch for symptoms with new medicines or herbal supplements:  Some medicines, including herbal supplements, may trigger a psoriasis flare-up  Ask if any of the medicines you take may be making your psoriasis worse  Always check for skin changes when you take your medicines  · Do not smoke:  Smoking can trigger a flare-up of psoriasis  If you smoke, it is never too late to quit  Ask for information about how to stop  · Avoid triggers:  Injury to the skin, cold weather, and heavy alcohol use are other things that can trigger psoriasis flare-ups    When should I contact my healthcare provider? · You get pregnant  · You have a fever  · Your skin plaques are not getting better or are getting worse  · You cannot sleep because your skin itches  · Your skin plaques have pus draining from them or they have soft yellow scabs  · You have questions or concerns about your condition or care  When should I seek immediate care? · Psoriasis suddenly covers larger areas of your body and becomes more painful  CARE AGREEMENT:   You have the right to help plan your care  Learn about your health condition and how it may be treated  Discuss treatment options with your caregivers to decide what care you want to receive  You always have the right to refuse treatment  The above information is an  only  It is not intended as medical advice for individual conditions or treatments  Talk to your doctor, nurse or pharmacist before following any medical regimen to see if it is safe and effective for you  © 2017 [employer] Information is for End User's use only and may not be sold, redistributed or otherwise used for commercial purposes  All illustrations and images included in CareNotes® are the copyrighted property of [employer]  or Gustavo Starr

## 2020-02-10 ENCOUNTER — OFFICE VISIT (OUTPATIENT)
Dept: FAMILY MEDICINE CLINIC | Facility: CLINIC | Age: 30
End: 2020-02-10
Payer: COMMERCIAL

## 2020-02-10 VITALS
RESPIRATION RATE: 16 BRPM | WEIGHT: 174 LBS | HEART RATE: 71 BPM | DIASTOLIC BLOOD PRESSURE: 70 MMHG | HEIGHT: 66 IN | TEMPERATURE: 98.1 F | OXYGEN SATURATION: 99 % | SYSTOLIC BLOOD PRESSURE: 122 MMHG | BODY MASS INDEX: 27.97 KG/M2

## 2020-02-10 DIAGNOSIS — Z00.00 HEALTH MAINTENANCE EXAMINATION: Primary | ICD-10-CM

## 2020-02-10 DIAGNOSIS — L42 PITYRIASIS ROSEA: ICD-10-CM

## 2020-02-10 DIAGNOSIS — J02.9 SORE THROAT: ICD-10-CM

## 2020-02-10 DIAGNOSIS — G43.109 MIGRAINE WITH AURA AND WITHOUT STATUS MIGRAINOSUS, NOT INTRACTABLE: ICD-10-CM

## 2020-02-10 DIAGNOSIS — E66.3 OVERWEIGHT (BMI 25.0-29.9): ICD-10-CM

## 2020-02-10 DIAGNOSIS — M54.16 LUMBAR RADICULOPATHY: ICD-10-CM

## 2020-02-10 DIAGNOSIS — F41.9 ANXIETY: ICD-10-CM

## 2020-02-10 LAB — S PYO AG THROAT QL: NEGATIVE

## 2020-02-10 PROCEDURE — 87147 CULTURE TYPE IMMUNOLOGIC: CPT | Performed by: NURSE PRACTITIONER

## 2020-02-10 PROCEDURE — 87070 CULTURE OTHR SPECIMN AEROBIC: CPT | Performed by: NURSE PRACTITIONER

## 2020-02-10 PROCEDURE — 99395 PREV VISIT EST AGE 18-39: CPT | Performed by: NURSE PRACTITIONER

## 2020-02-10 PROCEDURE — 87880 STREP A ASSAY W/OPTIC: CPT | Performed by: NURSE PRACTITIONER

## 2020-02-10 NOTE — ASSESSMENT & PLAN NOTE
Reassurance provided  Information provided  Discussed possible benefit with OTC antihistamine- claritin drug of choice during breast feeding, short term use only, may cause decrease in milk production  Ultimately rash self limiting  Patient asymptomatic  Handout provided

## 2020-02-10 NOTE — PROGRESS NOTES
Assessment/Plan:    Health Maintenance  Lifestyle Advice: begin progressive daily aerobic exercise program and follow a low fat, low cholesterol diet  Diet: well balanced diet  Exercise: several times per week  Dental: no dental visits for >1 year and brushes teeth twice daily  Vision: goes for regular eye exams, most recent eye exam <1 year, wears glasses and wears contacts   Preventative Health: Female Preventative: Exercises regularly  Pap up to date  Follows with OBGYN  Due for labs, these were ordered  Migraine with aura  Rare now that she is off of oral contraceptives  Ibuprofen as needed  Well controlled  Lumbar radiculopathy  Stable with rare use of ibuprofen as needed  Overweight (BMI 25 0-29  9)  See BMI f/u plan    Pityriasis rosea  Reassurance provided  Information provided  Discussed possible benefit with OTC antihistamine- claritin drug of choice during breast feeding, short term use only, may cause decrease in milk production  Ultimately rash self limiting  Patient asymptomatic  Handout provided  Sore throat  Rapid strep negative  Exudate present  Throat culture sent out  Patient did have strep twice recently  May need ENT eval for tonsillectomy patient does not wish to have tonsils removed  Will monitor  Will call with throat culture results  Conservative management at this time, fluids, tylenol, and salt water gargles  Anxiety  On Celexa, managed by OBGYN, stable  Diagnoses and all orders for this visit:    Health maintenance examination  -     Lipid panel; Future  -     TSH, 3rd generation with Free T4 reflex; Future  -     Comprehensive metabolic panel; Future    Sore throat  -     POCT rapid strepA  -     Throat culture; Future  -     Throat culture    Lumbar radiculopathy    Migraine with aura and without status migrainosus, not intractable    Overweight (BMI 25 0-29  9)    Pityriasis rosea    Anxiety                  Subjective:      Patient ID: Jack Blizzard is a 34 y o  female  Here to establish care as a new patient and for physical  Overall doing well  Has Sore throat for 1 day  Was diagnosed with strep in November and December  Tried tylenol as needed at this time  Patient has rash that's been present for about 6-8 weeks  Was seen at urgent care for this, was given steroid without any benefit  Does not itch  Believes it may have started on her trunk  Currently breast feeding  Baby was born November 2018  She is currently breast feeding  Has 3year old as well  On Celexa which is managed by OBGYN  Currently working on improving diet  Doesn't do monthly breast exams  The following portions of the patient's history were reviewed and updated as appropriate: allergies, current medications, past family history, past medical history, past social history, past surgical history and problem list     Review of Systems   Constitutional: Negative for chills and fever  HENT: Positive for sore throat  Eyes: Negative for discharge  Respiratory: Negative for shortness of breath  Cardiovascular: Negative for chest pain  Gastrointestinal: Negative for constipation and diarrhea  Genitourinary: Negative for difficulty urinating  Musculoskeletal: Negative for joint swelling  Skin: Positive for rash  Neurological: Negative for headaches  Hematological: Negative for adenopathy  Psychiatric/Behavioral: The patient is not nervous/anxious  Objective:    /70   Pulse 71   Temp 98 1 °F (36 7 °C) (Temporal)   Resp 16   Ht 5' 5 75" (1 67 m)   Wt 78 9 kg (174 lb)   SpO2 99%   BMI 28 30 kg/m²        Physical Exam   Constitutional: She is oriented to person, place, and time  She appears well-developed  No distress  overweight   HENT:   Head: Normocephalic and atraumatic  Right Ear: Hearing, tympanic membrane, external ear and ear canal normal  Tympanic membrane is not perforated, not erythematous, not retracted and not bulging     Left Ear: Hearing, tympanic membrane, external ear and ear canal normal  Tympanic membrane is not perforated, not erythematous, not retracted and not bulging  Mouth/Throat: Uvula is midline  Posterior oropharyngeal edema and posterior oropharyngeal erythema present  Tonsils are 2+ on the right  Tonsils are 2+ on the left  Tonsillar exudate  Eyes: Conjunctivae and lids are normal  Right eye exhibits no discharge  Left eye exhibits no discharge  Neck: Neck supple  Cardiovascular: Normal rate and regular rhythm  No murmur heard  Pulmonary/Chest: Effort normal and breath sounds normal  No respiratory distress  She has no wheezes  Musculoskeletal: She exhibits no deformity  Neurological: She is alert and oriented to person, place, and time  Coordination normal    Skin: Skin is warm and dry  She is not diaphoretic  Psychiatric: She has a normal mood and affect  Her speech is normal and behavior is normal  Judgment and thought content normal  Cognition and memory are normal    Nursing note and vitals reviewed  Pollen extract    Nata Holloway had no medications administered during this visit    Health Maintenance   Topic Date Due    BMI: Followup Plan  03/01/2008    Annual Physical  03/01/2008    BMI: Adult  12/10/2020    Depression Screening PHQ  02/10/2021    Cervical Cancer Screening  05/22/2021    DTaP,Tdap,and Td Vaccines (4 - Td) 09/25/2028    Pneumococcal Vaccine: 65+ Years (1 of 2 - PCV13) 03/01/2055    HIV Screening  Completed    Influenza Vaccine  Completed    Pneumococcal Vaccine: Pediatrics (0 to 5 Years) and At-Risk Patients (6 to 59 Years)  Aged Out    HIB Vaccine  Aged Out    Hepatitis B Vaccine  Aged Out    IPV Vaccine  Aged Out    Hepatitis A Vaccine  Aged Out    Meningococcal ACWY Vaccine  Aged Out    HPV Vaccine  Aged Out      Social History     Socioeconomic History    Marital status: /Civil Union     Spouse name: Jason Albarado Number of children: Not on file    Years of education: Not on file    Highest education level: Not on file   Occupational History    Occupation: Teacher     Comment: [employer] 1978 [employer] [address] resource strain: Not on file    Food insecurity:     Worry: Not on file     Inability: Not on file    Transportation needs:     Medical: Not on file     Non-medical: Not on file   Tobacco Use    Smoking status: Never Smoker    Smokeless tobacco: Never Used   Substance and Sexual Activity    Alcohol use: No     Comment: social alcohol use per [employer]scripts    Drug use: No    Sexual activity: Yes     Partners: Male   Lifestyle    Physical activity:     Days per week: Not on file     Minutes per session: Not on file    Stress: Not on file   Relationships    Social connections:     Talks on phone: Not on file     Gets together: Not on file     Attends Oriental orthodox service: Not on file     Active member of club or organization: Not on file     Attends meetings of clubs or organizations: Not on file     Relationship status: Not on file    Intimate partner violence:     Fear of current or ex partner: Not on file     Emotionally abused: Not on file     Physically abused: Not on file     Forced sexual activity: Not on file   Other Topics Concern    Not on file   Social History Narrative    Always uses seat belt    Daily caffeinated coffee consumption    Exercises regularly      Family History   Problem Relation Age of Onset    Cancer Mother         hodgkins lymphome    No Known Problems Father     No Known Problems Sister     No Known Problems Brother     Cancer Maternal Grandmother         pancreatic    Heart disease Paternal Grandfather         CHF    No Known Problems Daughter       Past Medical History:   Diagnosis Date    Acne     was on Amoxicillin d/c once she found out she was pregnant    Angioma     developed during pregnancy    Asthma     exercise induced asthma no current inhaler    Migraine     Normal delivery 2016 daughter    Varicella     ? has a past surgical history that includes Tooth extraction  Patient Active Problem List    Diagnosis Date Noted    Pityriasis rosea 02/10/2020    Overweight (BMI 25 0-29 9) 02/10/2020    Sore throat 02/10/2020    Anxiety 02/10/2020    Lumbar radiculopathy 09/26/2017    Migraine with aura 01/22/2015       Current Outpatient Medications:     citalopram (CeleXA) 20 mg tablet, Take 1 tablet (20 mg total) by mouth daily, Disp: 90 tablet, Rfl: 3    ibuprofen (MOTRIN) 600 mg tablet, Take 1 tablet (600 mg total) by mouth every 6 (six) hours as needed for mild pain, Disp: 30 tablet, Rfl: 0    Prenatal Multivit-Min-Fe-FA (PRENATAL #2 PO), Take 1 tablet by mouth daily  , Disp: , Rfl:               BMI Counseling: Body mass index is 28 3 kg/m²  The BMI is above normal  Nutrition recommendations include decreasing portion sizes, limiting drinks that contain sugar and reducing intake of cholesterol  Exercise recommendations include moderate physical activity 150 minutes/week, exercising 3-5 times per week and strength training exercises  No pharmacotherapy was ordered

## 2020-02-10 NOTE — PATIENT INSTRUCTIONS
Rash should resolve on it's own  Over the counter antihistamines are only okay for short term use during breast feeding  Claritin would be the safest option- may have impact on milk production  Make appointment with dentist      J Carlos White with healthy diet/exercise  Complete blood work, which is fasting  We will call with throat culture results  Continue with tylenol as needed, salt water gargles, and increase hydration  Please call if no improvement  Please call the office if you are experiencing any worsening of symptoms or no symptom improvement  Pityriasis rosea   WHAT YOU NEED TO KNOW:   What is Pityriasis rosea? Pityriasis rosea is a skin disorder that causes a scaly rash  The cause of Pityriasis rosea is not known  It usually goes away on its own in 2 to 12 weeks  Pityriasis rosea most often occurs in people who are 8to 28years old and during pregnancy  What are the signs and symptoms of Pityriasis rosea? The rash first appears as a single pink patch on the chest or back  In people who have dark skin, the color may be violet or dark gray  Within 90 days of the first patch, the rash spreads to the rest of the torso  The rash can also spread to the neck, arms, and legs  Some people with Pityriasis rosea have mild to moderate itching  How is Pityriasis rosea diagnosed? Your healthcare provider will examine your rash and ask about your symptoms  He may take a sample of your skin to check for a fungal infection  How is Pityriasis rosea treated? Your healthcare provider may prescribe antihistamines or steroids to help reduce itching  They may be given as a pill or cream  Severe cases may be treated with ultraviolet light therapy  How can I manage my symptoms? Heat may irritate your skin and cause itching  Avoid hot showers and physical activity that may make your skin too warm  When should I contact my healthcare provider? · Your rash does not improve within 10 weeks  · Your itching becomes worse  · Your rash becomes more red and you have fever  CARE AGREEMENT:   You have the right to help plan your care  Learn about your health condition and how it may be treated  Discuss treatment options with your caregivers to decide what care you want to receive  You always have the right to refuse treatment  The above information is an  only  It is not intended as medical advice for individual conditions or treatments  Talk to your doctor, nurse or pharmacist before following any medical regimen to see if it is safe and effective for you  © 2017 [address] [employer]  Information is for End User's use only and may not be sold, redistributed or otherwise used for commercial purposes  All illustrations and images included in CareNotes® are the copyrighted property of A D A M [employer]  or [employer]  Wellness Visit for Adults   AMBULATORY CARE:   A wellness visit  is when you see your healthcare provider to get screened for health problems  You can also get advice on how to stay healthy  Write down your questions so you remember to ask them  Ask your healthcare provider how often you should have a wellness visit  What happens at a wellness visit:  Your healthcare provider will ask about your health, and your family history of health problems  This includes high blood pressure, heart disease, and cancer  He or she will ask if you have symptoms that concern you, if you smoke, and about your mood  You may also be asked about your intake of medicines, supplements, food, and alcohol  Any of the following may be done:  · Your weight  will be checked  Your height may also be checked so your body mass index (BMI) can be calculated  Your BMI shows if you are at a healthy weight  · Your blood pressure  and heart rate will be checked  Your temperature may also be checked  · Blood and urine tests  may be done   Blood tests may be done to check your cholesterol levels  Abnormal cholesterol levels increase your risk for heart disease and stroke  You may also need a blood or urine test to check for diabetes if you are at increased risk  Urine tests may be done to look for signs of an infection or kidney disease  · A physical exam  includes checking your heartbeat and lungs with a stethoscope  Your healthcare provider may also check your skin to look for sun damage  · Screening tests  may be recommended  A screening test is done to check for diseases that may not cause symptoms  The screening tests you may need depend on your age, gender, family history, and lifestyle habits  For example, colorectal screening may be recommended if you are 48years old or older  Screening tests you need if you are a woman:   · A Pap smear  is used to screen for cervical cancer  Pap smears are usually done every 3 to 5 years depending on your age  You may need them more often if you have had abnormal Pap smear test results in the past  Ask your healthcare provider how often you should have a Pap smear  · A mammogram  is an x-ray of your breasts to screen for breast cancer  Experts recommend mammograms every 2 years starting at age 48 years  You may need a mammogram at age 52 years or younger if you have an increased risk for breast cancer  Talk to your healthcare provider about when you should start having mammograms and how often you need them  Vaccines you may need:   · Get an influenza vaccine  every year  The influenza vaccine protects you from the flu  Several types of viruses cause the flu  The viruses change over time, so new vaccines are made each year  · Get a tetanus-diphtheria (Td) booster vaccine  every 10 years  This vaccine protects you against tetanus and diphtheria  Tetanus is a severe infection that may cause painful muscle spasms and lockjaw   Diphtheria is a severe bacterial infection that causes a thick covering in the back of your mouth and throat  · Get a human papillomavirus (HPV) vaccine  if you are female and aged 23 to 32 or male 23 to 24 and never received it  This vaccine protects you from HPV infection  HPV is the most common infection spread by sexual contact  HPV may also cause vaginal, penile, and anal cancers  · Get a pneumococcal vaccine  if you are aged 72 years or older  The pneumococcal vaccine is an injection given to protect you from pneumococcal disease  Pneumococcal disease is an infection caused by pneumococcal bacteria  The infection may cause pneumonia, meningitis, or an ear infection  · Get a shingles vaccine  if you are aged 61 or older, even if you have had shingles before  The shingles vaccine is an injection to protect you from the varicella-zoster virus  This is the same virus that causes chickenpox  Shingles is a painful rash that develops in people who had chickenpox or have been exposed to the virus  How to eat healthy:  My Plate is a model for planning healthy meals  It shows the types and amounts of foods that should go on your plate  Fruits and vegetables make up about half of your plate, and grains and protein make up the other half  A serving of dairy is included on the side of your plate  The amount of calories and serving sizes you need depends on your age, gender, weight, and height  Examples of healthy foods are listed below:  · Eat a variety of vegetables  such as dark green, red, and orange vegetables  You can also include canned vegetables low in sodium (salt) and frozen vegetables without added butter or sauces  · Eat a variety of fresh fruits , canned fruit in 100% juice, frozen fruit, and dried fruit  · Include whole grains  At least half of the grains you eat should be whole grains   Examples include whole-wheat bread, wheat pasta, brown rice, and whole-grain cereals such as oatmeal     · Eat a variety of protein foods such as seafood (fish and shellfish), lean meat, and poultry without skin (turkey and chicken)  Examples of lean meats include pork leg, shoulder, or tenderloin, and beef round, sirloin, tenderloin, and extra lean ground beef  Other protein foods include eggs and egg substitutes, beans, peas, soy products, nuts, and seeds  · Choose low-fat dairy products such as skim or 1% milk or low-fat yogurt, cheese, and cottage cheese  · Limit unhealthy fats  such as butter, hard margarine, and shortening  Exercise:  Exercise at least 30 minutes per day on most days of the week  Some examples of exercise include walking, biking, dancing, and swimming  You can also fit in more physical activity by taking the stairs instead of the elevator or parking farther away from stores  Include muscle strengthening activities 2 days each week  Regular exercise provides many health benefits  It helps you manage your weight, and decreases your risk for type 2 diabetes, heart disease, stroke, and high blood pressure  Exercise can also help improve your mood  Ask your healthcare provider about the best exercise plan for you  General health and safety guidelines:   · Do not smoke  Nicotine and other chemicals in cigarettes and cigars can cause lung damage  Ask your healthcare provider for information if you currently smoke and need help to quit  E-cigarettes or smokeless tobacco still contain nicotine  Talk to your healthcare provider before you use these products  · Limit alcohol  A drink of alcohol is 12 ounces of beer, 5 ounces of wine, or 1½ ounces of liquor  · Lose weight, if needed  Being overweight increases your risk of certain health conditions  These include heart disease, high blood pressure, type 2 diabetes, and certain types of cancer  · Protect your skin  Do not sunbathe or use tanning beds  Use sunscreen with a SPF 15 or higher  Apply sunscreen at least 15 minutes before you go outside  Reapply sunscreen every 2 hours   Wear protective clothing, hats, and sunglasses when you are outside  · Drive safely  Always wear your seatbelt  Make sure everyone in your car wears a seatbelt  A seatbelt can save your life if you are in an accident  Do not use your cell phone when you are driving  This could distract you and cause an accident  Pull over if you need to make a call or send a text message  · Practice safe sex  Use latex condoms if are sexually active and have more than one partner  Your healthcare provider may recommend screening tests for sexually transmitted infections (STIs)  · Wear helmets, lifejackets, and protective gear  Always wear a helmet when you ride a bike or motorcycle, go skiing, or play sports that could cause a head injury  Wear protective equipment when you play sports  Wear a lifejacket when you are on a boat or doing water sports  © 2017 [address] [facility] Information is for End User's use only and may not be sold, redistributed or otherwise used for commercial purposes  All illustrations and images included in CareNotes® are the copyrighted property of [employer]  or Gustavo Starr  The above information is an  only  It is not intended as medical advice for individual conditions or treatments  Talk to your doctor, nurse or pharmacist before following any medical regimen to see if it is safe and effective for you

## 2020-02-10 NOTE — ASSESSMENT & PLAN NOTE
Rapid strep negative  Exudate present  Throat culture sent out  Patient did have strep twice recently  May need ENT eval for tonsillectomy patient does not wish to have tonsils removed  Will monitor  Will call with throat culture results  Conservative management at this time, fluids, tylenol, and salt water gargles

## 2020-02-11 ENCOUNTER — TELEPHONE (OUTPATIENT)
Dept: FAMILY MEDICINE CLINIC | Facility: CLINIC | Age: 30
End: 2020-02-11

## 2020-02-11 DIAGNOSIS — J03.91 RECURRENT TONSILLITIS: ICD-10-CM

## 2020-02-11 DIAGNOSIS — J02.0 STREP PHARYNGITIS: Primary | ICD-10-CM

## 2020-02-11 LAB — BACTERIA THROAT CULT: ABNORMAL

## 2020-02-11 RX ORDER — CEPHALEXIN 500 MG/1
500 CAPSULE ORAL EVERY 12 HOURS SCHEDULED
Qty: 20 CAPSULE | Refills: 0 | Status: SHIPPED | OUTPATIENT
Start: 2020-02-11 | End: 2020-02-21

## 2020-02-11 NOTE — TELEPHONE ENCOUNTER
----- Message from [address] sent at 2/11/2020 12:04 PM EST -----  Please let her know her throat culture came back positive for strep  She will need to be treated and she will need to follow up with ENT due to recurrent strep infections  Referral for ENT placed and antibiotic sent to pharmacy  Antibiotic is keflex, this is safe to use while breastfeeding  They recommend monitoring for any infant diarrhea

## 2020-03-05 ENCOUNTER — APPOINTMENT (OUTPATIENT)
Dept: LAB | Facility: MEDICAL CENTER | Age: 30
End: 2020-03-05
Payer: COMMERCIAL

## 2020-03-05 DIAGNOSIS — Z00.00 HEALTH MAINTENANCE EXAMINATION: ICD-10-CM

## 2020-03-05 LAB
ALBUMIN SERPL BCP-MCNC: 4 G/DL (ref 3.5–5)
ALP SERPL-CCNC: 65 U/L (ref 46–116)
ALT SERPL W P-5'-P-CCNC: 33 U/L (ref 12–78)
ANION GAP SERPL CALCULATED.3IONS-SCNC: 5 MMOL/L (ref 4–13)
AST SERPL W P-5'-P-CCNC: 19 U/L (ref 5–45)
BILIRUB SERPL-MCNC: 0.69 MG/DL (ref 0.2–1)
BUN SERPL-MCNC: 13 MG/DL (ref 5–25)
CALCIUM SERPL-MCNC: 8.9 MG/DL (ref 8.3–10.1)
CHLORIDE SERPL-SCNC: 104 MMOL/L (ref 100–108)
CHOLEST SERPL-MCNC: 203 MG/DL (ref 50–200)
CO2 SERPL-SCNC: 28 MMOL/L (ref 21–32)
CREAT SERPL-MCNC: 0.65 MG/DL (ref 0.6–1.3)
GFR SERPL CREATININE-BSD FRML MDRD: 120 ML/MIN/1.73SQ M
GLUCOSE P FAST SERPL-MCNC: 78 MG/DL (ref 65–99)
HDLC SERPL-MCNC: 75 MG/DL
LDLC SERPL CALC-MCNC: 122 MG/DL (ref 0–100)
NONHDLC SERPL-MCNC: 128 MG/DL
POTASSIUM SERPL-SCNC: 4.3 MMOL/L (ref 3.5–5.3)
PROT SERPL-MCNC: 8 G/DL (ref 6.4–8.2)
SODIUM SERPL-SCNC: 137 MMOL/L (ref 136–145)
TRIGL SERPL-MCNC: 32 MG/DL
TSH SERPL DL<=0.05 MIU/L-ACNC: 1.72 UIU/ML (ref 0.36–3.74)

## 2020-03-05 PROCEDURE — 36415 COLL VENOUS BLD VENIPUNCTURE: CPT

## 2020-03-05 PROCEDURE — 84443 ASSAY THYROID STIM HORMONE: CPT

## 2020-03-05 PROCEDURE — 80061 LIPID PANEL: CPT

## 2020-03-05 PROCEDURE — 80053 COMPREHEN METABOLIC PANEL: CPT

## 2020-04-07 ENCOUNTER — TELEMEDICINE (OUTPATIENT)
Dept: OBGYN CLINIC | Facility: CLINIC | Age: 30
End: 2020-04-07
Payer: COMMERCIAL

## 2020-04-07 VITALS — WEIGHT: 170 LBS | BODY MASS INDEX: 27.65 KG/M2 | TEMPERATURE: 99 F

## 2020-04-07 DIAGNOSIS — F41.9 ANXIETY: ICD-10-CM

## 2020-04-07 PROCEDURE — 99214 OFFICE O/P EST MOD 30 MIN: CPT | Performed by: PHYSICIAN ASSISTANT

## 2020-04-07 RX ORDER — CITALOPRAM 20 MG/1
TABLET ORAL
Qty: 180 TABLET | Refills: 3 | Status: SHIPPED | OUTPATIENT
Start: 2020-04-07 | End: 2021-04-13

## 2020-06-03 ENCOUNTER — APPOINTMENT (OUTPATIENT)
Dept: RADIOLOGY | Facility: CLINIC | Age: 30
End: 2020-06-03
Payer: COMMERCIAL

## 2020-06-03 ENCOUNTER — OFFICE VISIT (OUTPATIENT)
Dept: URGENT CARE | Facility: CLINIC | Age: 30
End: 2020-06-03
Payer: COMMERCIAL

## 2020-06-03 VITALS
TEMPERATURE: 97.2 F | RESPIRATION RATE: 18 BRPM | OXYGEN SATURATION: 98 % | HEART RATE: 83 BPM | SYSTOLIC BLOOD PRESSURE: 118 MMHG | DIASTOLIC BLOOD PRESSURE: 74 MMHG
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
## 2020-06-03 DIAGNOSIS — S99.921A INJURY OF RIGHT FOOT, INITIAL ENCOUNTER: ICD-10-CM

## 2020-06-03 DIAGNOSIS — S93.501A SPRAIN OF RIGHT GREAT TOE, INITIAL ENCOUNTER: Primary | ICD-10-CM

## 2020-06-03 PROCEDURE — 73630 X-RAY EXAM OF FOOT: CPT

## 2020-06-03 PROCEDURE — 99213 OFFICE O/P EST LOW 20 MIN: CPT | Performed by: PHYSICIAN ASSISTANT

## 2020-08-05 ENCOUNTER — OFFICE VISIT (OUTPATIENT)
Dept: FAMILY MEDICINE CLINIC | Facility: CLINIC | Age: 30
End: 2020-08-05
Payer: COMMERCIAL

## 2020-08-05 VITALS
HEIGHT: 66 IN | OXYGEN SATURATION: 98 % | RESPIRATION RATE: 16 BRPM | DIASTOLIC BLOOD PRESSURE: 80 MMHG | SYSTOLIC BLOOD PRESSURE: 120 MMHG | BODY MASS INDEX: 27.38 KG/M2 | WEIGHT: 170.4 LBS | TEMPERATURE: 98.5 F | HEART RATE: 63 BPM

## 2020-08-05 DIAGNOSIS — M54.41 ACUTE MIDLINE LOW BACK PAIN WITH RIGHT-SIDED SCIATICA: Primary | ICD-10-CM

## 2020-08-05 PROBLEM — J02.9 SORE THROAT: Status: RESOLVED | Noted: 2020-02-10 | Resolved: 2020-08-05

## 2020-08-05 PROCEDURE — 3008F BODY MASS INDEX DOCD: CPT | Performed by: NURSE PRACTITIONER

## 2020-08-05 PROCEDURE — 99213 OFFICE O/P EST LOW 20 MIN: CPT | Performed by: NURSE PRACTITIONER

## 2020-08-05 PROCEDURE — 1036F TOBACCO NON-USER: CPT | Performed by: NURSE PRACTITIONER

## 2020-08-05 RX ORDER — PREDNISONE 10 MG/1
TABLET ORAL
Qty: 21 TABLET | Refills: 0 | Status: SHIPPED | OUTPATIENT
Start: 2020-08-05 | End: 2021-01-27 | Stop reason: ALTCHOICE

## 2020-08-05 NOTE — PATIENT INSTRUCTIONS
Start prednisone, this is the steroid  It will be 6 pills today and decrease by 1 pill each day for the following 6 days  So it will be 6-5-4-3-2-1  Take this with food as it may upset your stomach  It's best to take it earlier in the day otherwise it may keep you up at night  Do not take this with NSAIDs such as advil/ibuprofen/advil/aleve/motrin  Start lower back exercises as tolerated  If no improvement PT is next step  Please call the office if you are experiencing any worsening of symptoms or no symptom improvement  Lower Back Exercises   AMBULATORY CARE:   Lower back exercises  help heal and strengthen your back muscles to prevent another injury  Ask your healthcare provider if you need to see a physical therapist for more advanced exercises  Seek care immediately if:   · You have severe pain that prevents you from moving  Contact your healthcare provider if:   · Your pain becomes worse  · You have new pain  · You have questions or concerns about your condition or care  Do lower back exercises safely:   · Do the exercises on a mat or firm surface  (not on a bed) to support your spine and prevent low back pain  · Move slowly and smoothly  Avoid fast or jerky motions  · Breathe normally  Do not hold your breath  · Stop if you feel pain  It is normal to feel some discomfort at first  Regular exercise will help decrease your discomfort over time  Lower back exercises: Your healthcare provider may recommend that you do back exercises 10 to 30 minutes each day  He may also recommend that you do exercises 1 to 3 times each day  Ask your healthcare provider which exercises are best for you and how often to do them  · Ankle pumps:  Lie on your back  Move your foot up (with your toes pointing toward your head)  Then, move your foot down (with your toes pointing away from you)  Repeat this exercise 10 times on each side  · Heel slides:  Lie on your back   Slowly bend one leg and then straighten it  Next, bend the other leg and then straighten it  Repeat 10 times on each side  · Pelvic tilt:  Lie on your back with your knees bent and feet flat on the floor  Place your arms in a relaxed position beside your body  Tighten the muscles of your abdomen and flatten your back against the floor  Hold for 5 seconds  Repeat 5 times  · Back stretch:  Lie on your back with your hands behind your head  Bend your knees and turn the lower half of your body to one side  Hold this position for 10 seconds  Repeat 3 times on each side  · Straight leg raises:  Lie on your back with one leg straight  Bend the other knee  Tighten your abdomen and then slowly lift the straight leg up about 6 to 12 inches off the floor  Hold for 1 to 5 seconds  Lower your leg slowly  Repeat 10 times on each leg  · Knee-to-chest:  Lie on your back with your knees bent and feet flat on the floor  Pull one of your knees toward your chest and hold it there for 5 seconds  Return your leg to the starting position  Lift the other knee toward your chest and hold for 5 seconds  Do this 5 times on each side  · Cat and camel:  Place your hands and knees on the floor  Arch your back upward toward the ceiling and lower your head  Round out your spine as much as you can  Hold for 5 seconds  Lift your head upward and push your chest downward toward the floor  Hold for 5 seconds  Do 3 sets or as directed  · Wall squats:  Stand with your back against a wall  Tighten the muscles of your abdomen  Slowly lower your body until your knees are bent at a 45 degree angle  Hold this position for 5 seconds  Slowly move back up to a standing position  Repeat 10 times  · Curl up:  Lie on your back with your knees bent and feet flat on the floor  Place your hands, palms down, underneath the curve in your lower back   Next, with your elbows on the floor, lift your shoulders and chest 2 to 3 inches  Keep your head in line with your shoulders  Hold this position for 5 seconds  When you can do this exercise without pain for 10 to 15 seconds, you may add a rotation  While your shoulders and chest are lifted off the ground, turn slightly to the left and hold  Repeat on the other side  · Bird dog:  Place your hands and knees on the floor  Keep your wrists directly below your shoulders and your knees directly below your hips  Pull your belly button in toward your spine  Do not flatten or arch your back  Tighten your abdominal muscles  Raise one arm straight out so that it is aligned with your head  Next, raise the leg opposite your arm  Hold this position for 15 seconds  Lower your arm and leg slowly and change sides  Do 5 sets  © 2017 [address] [facility] Information is for End User's use only and may not be sold, redistributed or otherwise used for commercial purposes  All illustrations and images included in CareNotes® are the copyrighted property of [employer] M [employer]  or Gustavo Starr  The above information is an  only  It is not intended as medical advice for individual conditions or treatments  Talk to your doctor, nurse or pharmacist before following any medical regimen to see if it is safe and effective for you

## 2020-08-05 NOTE — ASSESSMENT & PLAN NOTE
Start prednisone, this is the steroid  It will be 6 pills today and decrease by 1 pill each day for the following 6 days  So it will be 6-5-4-3-2-1  Take this with food as it may upset your stomach  It's best to take it earlier in the day otherwise it may keep you up at night  Do not take this with NSAIDs such as advil/ibuprofen/advil/aleve/motrin  Advised to breast feed > 4 hours after high dose (> 20 mg) discussed lactation safety precautions- patient understands  No red flag symptoms present on exam    Start lower back stretching exercises as tolerated  Handout provided  Next step PT  Please call the office if you are experiencing any worsening of symptoms or no symptom improvement

## 2020-08-05 NOTE — PROGRESS NOTES
Assessment/Plan:    Acute midline low back pain with right-sided sciatica  Start prednisone, this is the steroid  It will be 6 pills today and decrease by 1 pill each day for the following 6 days  So it will be 6-5-4-3-2-1  Take this with food as it may upset your stomach  It's best to take it earlier in the day otherwise it may keep you up at night  Do not take this with NSAIDs such as advil/ibuprofen/advil/aleve/motrin  Advised to breast feed > 4 hours after high dose (> 20 mg) discussed lactation safety precautions- patient understands  No red flag symptoms present on exam    Start lower back stretching exercises as tolerated  Handout provided  Next step PT  Please call the office if you are experiencing any worsening of symptoms or no symptom improvement  Patient verbalizes understand and agrees with treatment plan  Diagnoses and all orders for this visit:    Acute midline low back pain with right-sided sciatica  -     predniSONE 10 mg tablet; Day 1: 6 tabs  Day 2: 5 tabs Day 3: 4 tabs  Day 4: 3 tabs  Day 5: 2 tabs  Day 6: 1 tab        Subjective:      Patient ID: Humaira Record is a 27 y o  female  Chief Complaint   Patient presents with    Back Pain     Pt is here w/ c/o back pain that has worsened over the past three weeks      Here for evaluation of lower back pain  Had issues with SI right joint pain about 4 years ago  No injury at that time that started the pain  Then over past three weeks it started flaring again  She's been doing a lot of stuff around the house and yard work which flared  Does have occasional right leg radiating pain to knee  So far has tried motrin/ tylenol which helps a little bit  No changes in bladder /bowel function or sensation  Back Pain   This is a recurrent problem  The current episode started 1 to 4 weeks ago  The problem occurs daily  The problem is unchanged  The pain is present in the lumbar spine and sacro-iliac   The quality of the pain is described as shooting and stabbing  The pain radiates to the right knee and right thigh  The pain is worse during the day  The symptoms are aggravated by bending and position  Pertinent negatives include no abdominal pain, bladder incontinence, bowel incontinence, chest pain, fever, headaches, numbness, paresthesias, pelvic pain, perianal numbness, tingling or weakness  She has tried analgesics and NSAIDs for the symptoms  The treatment provided mild relief  The following portions of the patient's history were reviewed and updated as appropriate: allergies, current medications, past family history, past social history and problem list     Review of Systems   Constitutional: Negative for chills and fever  Eyes: Negative for discharge  Respiratory: Negative for shortness of breath  Cardiovascular: Negative for chest pain  Gastrointestinal: Negative for abdominal pain, bowel incontinence, constipation and diarrhea  Genitourinary: Negative for bladder incontinence, difficulty urinating and pelvic pain  Musculoskeletal: Positive for back pain  Negative for joint swelling  Skin: Negative for rash  Neurological: Negative for tingling, weakness, numbness, headaches and paresthesias  Hematological: Negative for adenopathy  Psychiatric/Behavioral: The patient is not nervous/anxious  Objective:  /80   Pulse 63   Temp 98 5 °F (36 9 °C) (Temporal)   Resp 16   Ht 5' 5 75"   Wt 77 3 kg (170 lb 6 4 oz)   SpO2 98%   BMI 27 71 kg/m²      Physical Exam   Constitutional: She is oriented to person, place, and time  She appears well-developed  No distress  overweight   HENT:   Head: Normocephalic and atraumatic  Right Ear: External ear normal    Left Ear: External ear normal    Eyes: Conjunctivae and lids are normal  Right eye exhibits no discharge  Left eye exhibits no discharge  Neck: Neck supple  Cardiovascular: Normal rate and regular rhythm  No murmur heard    Pulmonary/Chest: Effort normal and breath sounds normal  No respiratory distress  She has no wheezes  Abdominal: Normal appearance  Musculoskeletal:         General: No swelling or deformity  Lumbar back: She exhibits decreased range of motion and tenderness  She exhibits no swelling  Back:       Right lower leg: No edema  Left lower leg: No edema  Comments: Noted areas of palpable tenderness  Negative straight leg test  +5 strength of lower extremities  Able to twist with full ROM  Limited ROM with back flexion/ extension limited due to pain  Able to walk without any limping present  No gait abnormality  No sensation changes  Neurological: She is alert and oriented to person, place, and time  Skin: Skin is warm and dry  She is not diaphoretic  Psychiatric: Her speech is normal and behavior is normal  Judgment and thought content normal    Nursing note and vitals reviewed  Current Outpatient Medications:     citalopram (CeleXA) 20 mg tablet, One to two po daily, Disp: 180 tablet, Rfl: 3    ibuprofen (MOTRIN) 600 mg tablet, Take 1 tablet (600 mg total) by mouth every 6 (six) hours as needed for mild pain, Disp: 30 tablet, Rfl: 0    Prenatal Multivit-Min-Fe-FA (PRENATAL #2 PO), Take 1 tablet by mouth daily  , Disp: , Rfl:     predniSONE 10 mg tablet, Day 1: 6 tabs  Day 2: 5 tabs Day 3: 4 tabs  Day 4: 3 tabs  Day 5: 2 tabs  Day 6: 1 tab, Disp: 21 tablet, Rfl: 0  Allergies   Allergen Reactions    Pollen Extract Sneezing

## 2020-09-28 DIAGNOSIS — Z30.09 BIRTH CONTROL COUNSELING: Primary | ICD-10-CM

## 2020-09-28 RX ORDER — ACETAMINOPHEN AND CODEINE PHOSPHATE 120; 12 MG/5ML; MG/5ML
1 SOLUTION ORAL DAILY
Qty: 90 TABLET | Refills: 3 | Status: SHIPPED | OUTPATIENT
Start: 2020-09-28 | End: 2021-07-14

## 2020-09-28 NOTE — PROGRESS NOTES
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

## 2021-01-06 ENCOUNTER — TELEMEDICINE (OUTPATIENT)
Dept: FAMILY MEDICINE CLINIC | Facility: CLINIC | Age: 31
End: 2021-01-06
Payer: COMMERCIAL

## 2021-01-06 DIAGNOSIS — Z20.822 EXPOSURE TO COVID-19 VIRUS: Primary | ICD-10-CM

## 2021-01-06 PROCEDURE — 99213 OFFICE O/P EST LOW 20 MIN: CPT | Performed by: FAMILY MEDICINE

## 2021-01-06 NOTE — PROGRESS NOTES
COVID-19 Virtual Visit     Assessment/Plan:    Problem List Items Addressed This Visit     None      Visit Diagnoses     Exposure to COVID-19 virus    -  Primary    Relevant Orders    Novel Coronavirus (COVID-19), PCR [facility] - Collected at [facility] (Completed)         Disposition:     I referred patient to one of our centralized sites for a COVID-19 swab  I have spent 15 minutes directly with the patient  Greater than 50% of this time was spent in counseling/coordination of care regarding: prognosis, risks and benefits of treatment options and impressions  Encounter provider Jacqualin Paget, DO    Provider located at [address] [place] [phone]    Recent Visits  Date Type Provider Dept   01/11/21 Telephone [address] recent visits within past 7 days and meeting all other requirements     Future Appointments  No visits were found meeting these conditions  Showing future appointments within next 150 days and meeting all other requirements      This virtual check-in was done via [employer] and patient was informed that this is a secure, HIPAA-compliant platform  She agrees to proceed  Patient agrees to participate in a virtual check in via telephone or video visit instead of presenting to the office to address urgent/immediate medical needs  Patient is aware this is a billable service  After connecting through [facility], the patient was identified by name and date of birth  Kasia Cortes was informed that this was a telemedicine visit and that the exam was being conducted confidentially over secure lines  My office door was closed  No one else was in the room  Kasia Cortes acknowledged consent and understanding of privacy and security of the telemedicine visit   I informed the patient that I have reviewed her record in Epic and presented the opportunity for her to ask any questions regarding the visit today  The patient agreed to participate  Subjective: Nathaniel Fernando is a 27 y o  female who is concerned about COVID-19  Patient's symptoms include sore throat and headache  Date of symptom onset: 1/5/2021  Date of exposure: 1/4/2021    Exposure:   Contact with a person who is under investigation (PUI) for or who is positive for COVID-19 within the last 14 days?: Yes    Hospitalized recently for fever and/or lower respiratory symptoms?: No      Currently a healthcare worker that is involved in direct patient care?: No      Works in a special setting where the risk of COVID-19 transmission may be high? (this may include long-term care, correctional and custodial facilities; homeless shelters; assisted-living facilities and group homes ): No      Resident in a special setting where the risk of COVID-19 transmission may be high? (this may include long-term care, correctional and custodial facilities; homeless shelters; assisted-living facilities and group homes ): No      Family member exposure    Lab Results   Component Value Date    [facility] 98 Not Detected 01/11/2021     Past Medical History:   Diagnosis Date    Acne     was on Amoxicillin d/c once she found out she was pregnant    Angioma     developed during pregnancy    Asthma     exercise induced asthma no current inhaler    Migraine     Normal delivery     2016 daughter   Lex Vega Varicella     ?      Past Surgical History:   Procedure Laterality Date    TOOTH EXTRACTION       Current Outpatient Medications   Medication Sig Dispense Refill    citalopram (CeleXA) 20 mg tablet One to two po daily 180 tablet 3    ibuprofen (MOTRIN) 600 mg tablet Take 1 tablet (600 mg total) by mouth every 6 (six) hours as needed for mild pain 30 tablet 0    norethindrone (MICRONOR) 0 35 MG tablet Take 1 tablet (0 35 mg total) by mouth daily 90 tablet 3    predniSONE 10 mg tablet Day 1: 6 tabs  Day 2: 5 tabs Day 3: 4 tabs  Day 4: 3 tabs  Day 5: 2 tabs  Day 6: 1 tab 21 tablet 0    Prenatal Multivit-Min-Fe-FA (PRENATAL #2 PO) Take 1 tablet by mouth daily  No current facility-administered medications for this visit  Allergies   Allergen Reactions    Pollen Extract Sneezing       Review of Systems   HENT: Positive for sore throat  Neurological: Positive for headaches  All other systems reviewed and are negative  Objective: There were no vitals filed for this visit  Physical Exam  Constitutional:       Appearance: She is not ill-appearing  Pulmonary:      Effort: Pulmonary effort is normal    Neurological:      Mental Status: She is alert  Psychiatric:         Mood and Affect: Mood normal        VIRTUAL VISIT DISCLAIMER    Jose Licnoln acknowledges that she has consented to an online visit or consultation  She understands that the online visit is based solely on information provided by her, and that, in the absence of a face-to-face physical evaluation by the physician, the diagnosis she receives is both limited and provisional in terms of accuracy and completeness  This is not intended to replace a full medical face-to-face evaluation by the physician  Jose Lincoln understands and accepts these terms

## 2021-01-07 DIAGNOSIS — Z20.822 EXPOSURE TO COVID-19 VIRUS: ICD-10-CM

## 2021-01-07 PROCEDURE — U0005 INFEC AGEN DETEC AMPLI PROBE: HCPCS | Performed by: FAMILY MEDICINE

## 2021-01-07 PROCEDURE — U0003 INFECTIOUS AGENT DETECTION BY NUCLEIC ACID (DNA OR RNA); SEVERE ACUTE RESPIRATORY SYNDROME CORONAVIRUS 2 (SARS-COV-2) (CORONAVIRUS DISEASE [COVID-19]), AMPLIFIED PROBE TECHNIQUE, MAKING USE OF HIGH THROUGHPUT TECHNOLOGIES AS DESCRIBED BY CMS-2020-01-R: HCPCS | Performed by: FAMILY MEDICINE

## 2021-01-08 LAB — SARS-COV-2 RNA SPEC QL NAA+PROBE: NOT DETECTED

## 2021-01-11 ENCOUNTER — TELEPHONE (OUTPATIENT)
Dept: FAMILY MEDICINE CLINIC | Facility: CLINIC | Age: 31
End: 2021-01-11

## 2021-01-11 DIAGNOSIS — Z20.822 EXPOSURE TO COVID-19 VIRUS: ICD-10-CM

## 2021-01-11 DIAGNOSIS — Z20.822 EXPOSURE TO COVID-19 VIRUS: Primary | ICD-10-CM

## 2021-01-11 PROCEDURE — U0005 INFEC AGEN DETEC AMPLI PROBE: HCPCS | Performed by: NURSE PRACTITIONER

## 2021-01-11 PROCEDURE — U0003 INFECTIOUS AGENT DETECTION BY NUCLEIC ACID (DNA OR RNA); SEVERE ACUTE RESPIRATORY SYNDROME CORONAVIRUS 2 (SARS-COV-2) (CORONAVIRUS DISEASE [COVID-19]), AMPLIFIED PROBE TECHNIQUE, MAKING USE OF HIGH THROUGHPUT TECHNOLOGIES AS DESCRIBED BY CMS-2020-01-R: HCPCS | Performed by: NURSE PRACTITIONER

## 2021-01-11 NOTE — TELEPHONE ENCOUNTER
When was the last time she was around this positive co worker? I would recommend she quarantine from that date, she can be tested on day 5 and then only quarantine for 7 days  Or quarantine for 10 days without the repeat testing

## 2021-01-11 NOTE — TELEPHONE ENCOUNTER
Patient called stating she had a telehealth visit with you on 01/06/2021 regarding exposure to Covid  She is stating a lot of her co-workers are getting Covid, she wants to know if she should be tested again, she is NOT currently having any symptoms  Please advise patient at [phone]

## 2021-01-11 NOTE — TELEPHONE ENCOUNTER
Patient did call back today- she states that her boss and her share a fairly large office  Her boss whom she shared an office with did just test positive  Her boss and her do not wear masks in the office but ARE 6 or more feet apart in the office when together, they measured the one day  Her and the other co-workers wear masks when around one another and are not normally around each other for more than 15 minutes at a time

## 2021-01-11 NOTE — TELEPHONE ENCOUNTER
Patient was last around positive patient on 1/5  They did shut down the facility that evening and everyone has been in quarantine since per patient  Yesterday would have been day 5  She is requesting to be retested  Please advise

## 2021-01-11 NOTE — TELEPHONE ENCOUNTER
It looks like she saw Dr Joe Mc so I'm not completely familiar with what she is experiencing  Dr  Markos Zavala note isn't complete  It appears she had symptoms 1/5 and was tested  The question at this time would be if she's asymptomatic, what is her exposure like to those she is working with? Masking? Within 6 feet? > 15 minutes cumulatively?

## 2021-01-12 LAB — SARS-COV-2 RNA SPEC QL NAA+PROBE: NOT DETECTED

## 2021-01-19 ENCOUNTER — TELEPHONE (OUTPATIENT)
Dept: OBGYN CLINIC | Facility: CLINIC | Age: 31
End: 2021-01-19

## 2021-01-19 NOTE — TELEPHONE ENCOUNTER
Pt has never missed any pills, never late taking them, small diet changes, some what stressed, but this btb has been going on for several months, will keep vv with [facility]

## 2021-01-19 NOTE — TELEPHONE ENCOUNTER
Pt would like a call regarding issues on her BC,  She is bleeding in the middle of her BC pack,  She does have a V  V appt with Tami aguilar for 1/27 for this,    pls cancel this if not needed after talking with pt,   thanks

## 2021-01-27 ENCOUNTER — TELEMEDICINE (OUTPATIENT)
Dept: OBGYN CLINIC | Facility: CLINIC | Age: 31
End: 2021-01-27
Payer: COMMERCIAL

## 2021-01-27 DIAGNOSIS — N92.6 IRREGULAR BLEEDING: Primary | ICD-10-CM

## 2021-01-27 PROCEDURE — 1036F TOBACCO NON-USER: CPT | Performed by: PHYSICIAN ASSISTANT

## 2021-01-27 PROCEDURE — 99213 OFFICE O/P EST LOW 20 MIN: CPT | Performed by: PHYSICIAN ASSISTANT

## 2021-01-27 NOTE — PROGRESS NOTES
Virtual Regular Visit      Assessment/Plan:    Problem List Items Addressed This Visit     None               Reason for visit is   Chief Complaint   Patient presents with    Consult     bleeding between periods on bc    Virtual Regular Visit        Encounter provider Dalia Smith PA-C    Provider located at [place] [address]  [address] [place] [address] [place] [address]      Recent Visits  No visits were found meeting these conditions  Showing recent visits within past 7 days and meeting all other requirements     Today's Visits  Date Type Provider Dept   01/27/21 Telemedicine Dalia Smith PA-C [facility] [place] [facility] today's visits and meeting all other requirements     Future Appointments  No visits were found meeting these conditions  Showing future appointments within next 150 days and meeting all other requirements        The patient was identified by name and date of birth  Cheryle Koh was informed that this is a telemedicine visit and that the visit is being conducted through [employer] and patient was informed that this is a secure, HIPAA-compliant platform  She agrees to proceed     My office door was closed  No one else was in the room  She acknowledged consent and understanding of privacy and security of the video platform  The patient has agreed to participate and understands they can discontinue the visit at any time  Patient is aware this is a billable service  Moise Mccord is a 27 y o  female for discussion  She has been on Yamel for contraception (has migraine with aura and is not an estrogen candidate) and has a couple days of bleeding twice a month; this is light to moderate, would not saturate a pad in a day       She takes her pill at the same time every day    She has a lot of stress between work and home    We discussed irregular bleeding as the most common side effect of progesterone only pills    She is not interested in changing methods to an IUD at this time and would prefer to observe at this time  She will let me know if things become more problematic in the future  HPI     Past Medical History:   Diagnosis Date    Acne     was on Amoxicillin d/c once she found out she was pregnant    Angioma     developed during pregnancy    Asthma     exercise induced asthma no current inhaler    Migraine     Normal delivery     2016 daughter    Varicella     ? Past Surgical History:   Procedure Laterality Date    TOOTH EXTRACTION         Current Outpatient Medications   Medication Sig Dispense Refill    citalopram (CeleXA) 20 mg tablet One to two po daily 180 tablet 3    ibuprofen (MOTRIN) 600 mg tablet Take 1 tablet (600 mg total) by mouth every 6 (six) hours as needed for mild pain 30 tablet 0    norethindrone (MICRONOR) 0 35 MG tablet Take 1 tablet (0 35 mg total) by mouth daily 90 tablet 3    Prenatal Multivit-Min-Fe-FA (PRENATAL #2 PO) Take 1 tablet by mouth daily  No current facility-administered medications for this visit  Allergies   Allergen Reactions    Pollen Extract Sneezing       Review of Systems    Video Exam    Vitals:       Physical Exam     I spent 12 minutes directly with the patient during this visit      VIRTUAL VISIT DISCLAIMER    Slim Garrison acknowledges that she has consented to an online visit or consultation  She understands that the online visit is based solely on information provided by her, and that, in the absence of a face-to-face physical evaluation by the physician, the diagnosis she receives is both limited and provisional in terms of accuracy and completeness  This is not intended to replace a full medical face-to-face evaluation by the physician  Slim Garrison understands and accepts these terms

## 2021-03-10 DIAGNOSIS — Z23 ENCOUNTER FOR IMMUNIZATION: ICD-10-CM

## 2021-04-12 DIAGNOSIS — F41.9 ANXIETY: ICD-10-CM

## 2021-04-13 RX ORDER — CITALOPRAM 20 MG/1
TABLET ORAL
Qty: 180 TABLET | Refills: 3 | Status: SHIPPED | OUTPATIENT
Start: 2021-04-13 | End: 2021-08-24 | Stop reason: SDUPTHER

## 2021-05-09 ENCOUNTER — OFFICE VISIT (OUTPATIENT)
Dept: URGENT CARE | Facility: MEDICAL CENTER | Age: 31
End: 2021-05-09
Payer: COMMERCIAL

## 2021-05-09 VITALS — HEART RATE: 95 BPM | RESPIRATION RATE: 16 BRPM | TEMPERATURE: 97.4 F | OXYGEN SATURATION: 96 %

## 2021-05-09 DIAGNOSIS — J02.9 PHARYNGITIS, UNSPECIFIED ETIOLOGY: Primary | ICD-10-CM

## 2021-05-09 LAB — S PYO AG THROAT QL: NEGATIVE

## 2021-05-09 PROCEDURE — 87880 STREP A ASSAY W/OPTIC: CPT | Performed by: PHYSICIAN ASSISTANT

## 2021-05-09 PROCEDURE — 87070 CULTURE OTHR SPECIMN AEROBIC: CPT | Performed by: PHYSICIAN ASSISTANT

## 2021-05-09 PROCEDURE — G0382 LEV 3 HOSP TYPE B ED VISIT: HCPCS | Performed by: PHYSICIAN ASSISTANT

## 2021-05-09 RX ORDER — AMOXICILLIN 500 MG/1
500 CAPSULE ORAL EVERY 8 HOURS SCHEDULED
Qty: 30 CAPSULE | Refills: 0 | Status: SHIPPED | OUTPATIENT
Start: 2021-05-09 | End: 2021-05-19

## 2021-05-09 NOTE — PROGRESS NOTES
[facility]        NAME: Carmelo Johnson is a 32 y o  female  : 1990    MRN: [id number]  DATE: May 9, 2021  TIME: 1:08 PM    Pulse 95   Temp (!) 97 4 °F (36 3 °C) (Tympanic)   Resp 16   LMP 2021   SpO2 96%     Assessment and Plan   Pharyngitis, unspecified etiology [J02 9]  1  Pharyngitis, unspecified etiology  POCT rapid strepA    amoxicillin (AMOXIL) 500 mg capsule         Patient Instructions       Follow up with PCP in 3-5 days  Proceed to  ER if symptoms worsen  Chief Complaint     Chief Complaint   Patient presents with    Sore Throat     Patient presents with a sore throat that started this morning  She denies exposure to covid  She is fully vaccinated as of 21  History of Present Illness       Pt with sore throat starting this morning, pt with family members with strep throat       Review of Systems   Review of Systems   Constitutional: Negative  HENT: Positive for sore throat  Eyes: Negative  Respiratory: Negative  Cardiovascular: Negative  Gastrointestinal: Negative  Endocrine: Negative  Genitourinary: Negative  Musculoskeletal: Negative  Allergic/Immunologic: Negative  Neurological: Negative  Hematological: Negative  Psychiatric/Behavioral: Negative  All other systems reviewed and are negative  Current Medications       Current Outpatient Medications:     citalopram (CeleXA) 20 mg tablet, TAKE 1 TO 2 TABLETS BY MOUTH EVERY DAY, Disp: 180 tablet, Rfl: 3    ibuprofen (MOTRIN) 600 mg tablet, Take 1 tablet (600 mg total) by mouth every 6 (six) hours as needed for mild pain, Disp: 30 tablet, Rfl: 0    norethindrone (MICRONOR) 0 35 MG tablet, Take 1 tablet (0 35 mg total) by mouth daily, Disp: 90 tablet, Rfl: 3    Prenatal Multivit-Min-Fe-FA (PRENATAL #2 PO), Take 1 tablet by mouth daily  , Disp: , Rfl:     amoxicillin (AMOXIL) 500 mg capsule, Take 1 capsule (500 mg total) by mouth every 8 (eight) hours for 10 days, Disp: 30 capsule, Rfl: 0    Current Allergies     Allergies as of 05/09/2021 - Reviewed 05/09/2021   Allergen Reaction Noted    Pollen extract Sneezing 08/14/2018            The following portions of the patient's history were reviewed and updated as appropriate: allergies, current medications, past family history, past medical history, past social history, past surgical history and problem list      Past Medical History:   Diagnosis Date    Acne     was on Amoxicillin d/c once she found out she was pregnant    Angioma     developed during pregnancy    Asthma     exercise induced asthma no current inhaler    Migraine     Normal delivery     2016 daughter   Sandrita Madison Varicella     ? Past Surgical History:   Procedure Laterality Date    TOOTH EXTRACTION         Family History   Problem Relation Age of Onset    Cancer Mother         hodgkins lymphome    No Known Problems Father     No Known Problems Sister     No Known Problems Brother     Cancer Maternal Grandmother         pancreatic    Heart disease Paternal Grandfather         CHF    No Known Problems Daughter          Medications have been verified  Objective   Pulse 95   Temp (!) 97 4 °F (36 3 °C) (Tympanic)   Resp 16   LMP 04/27/2021   SpO2 96%        Physical Exam     Physical Exam  Vitals signs and nursing note reviewed  Constitutional:       Appearance: She is normal weight  Comments: Pt declines covid-19 testing    HENT:      Head: Normocephalic and atraumatic  Right Ear: Tympanic membrane and ear canal normal       Left Ear: Tympanic membrane and ear canal normal       Mouth/Throat:      Mouth: Mucous membranes are moist       Pharynx: Uvula midline  No oropharyngeal exudate or posterior oropharyngeal erythema  Tonsils: No tonsillar exudate or tonsillar abscesses  Eyes:      Conjunctiva/sclera: Conjunctivae normal       Pupils: Pupils are equal, round, and reactive to light     Neck:      Musculoskeletal: Normal range of motion and neck supple  Cardiovascular:      Rate and Rhythm: Normal rate and regular rhythm  Heart sounds: Normal heart sounds  Pulmonary:      Effort: Pulmonary effort is normal       Breath sounds: Normal breath sounds  Abdominal:      General: Bowel sounds are normal       Palpations: Abdomen is soft  Skin:     General: Skin is warm  Capillary Refill: Capillary refill takes less than 2 seconds  Neurological:      General: No focal deficit present  Mental Status: She is alert     Psychiatric:         Mood and Affect: Mood normal

## 2021-05-11 ENCOUNTER — TELEPHONE (OUTPATIENT)
Dept: FAMILY MEDICINE CLINIC | Facility: CLINIC | Age: 31
End: 2021-05-11

## 2021-05-11 ENCOUNTER — OFFICE VISIT (OUTPATIENT)
Dept: FAMILY MEDICINE CLINIC | Facility: CLINIC | Age: 31
End: 2021-05-11
Payer: COMMERCIAL

## 2021-05-11 DIAGNOSIS — M53.3 COCCYX PAIN: Primary | ICD-10-CM

## 2021-05-11 PROCEDURE — 99213 OFFICE O/P EST LOW 20 MIN: CPT | Performed by: FAMILY MEDICINE

## 2021-05-11 PROCEDURE — 1036F TOBACCO NON-USER: CPT | Performed by: FAMILY MEDICINE

## 2021-05-11 RX ORDER — MELOXICAM 7.5 MG/1
7.5 TABLET ORAL DAILY
Qty: 30 TABLET | Refills: 0 | Status: SHIPPED | OUTPATIENT
Start: 2021-05-11 | End: 2021-06-03

## 2021-05-11 NOTE — TELEPHONE ENCOUNTER
Just to clarify I apologize she has tested positive for strep this past Saturday and has been on antibiotics since then  No symptoms in 48 hours she still ok to physically come in the office? Thank you

## 2021-05-11 NOTE — TELEPHONE ENCOUNTER
Pt tested positive for strep this past Saturday 05/08/21 pt has araceli on antibiotics since saturday afternoon  She has no symptoms presently  No symptoms in the past 48 hours per my verbal conversation with  pt  Would you prefer virtual or is she ok to come in today to see you for tailbone pain?    Pt's number is [phone]

## 2021-05-11 NOTE — PATIENT INSTRUCTIONS
[facility] [phone]  tail bone pain x 1-2 days painful to sit and walk   not accidnet related  Rec starting trial of meloxicam 7 5 mg once daily prn pain with food  May take 2 po qd if in severe pain with food, Do not sit for extended periods of time if in pain in coccyx area, call if worse or not better and consider coccyx and LS spine xray if not improved  Denied falling and injuring tailbone  Does have a hx of chronic low back pain

## 2021-05-11 NOTE — PROGRESS NOTES
Virtual Regular Visit      Assessment/Plan:    Problem List Items Addressed This Visit     None      Visit Diagnoses     Coccyx pain    -  Primary    Relevant Medications    meloxicam (MOBIC) 7 5 mg tablet               Reason for visit is   Chief Complaint   Patient presents with    Virtual Regular Visit        Encounter provider Kolton Wheat DO    Provider located at [address] [place] [address] [address]  [facility] [address] [phone]      Recent Visits  No visits were found meeting these conditions  Showing recent visits within past 7 days and meeting all other requirements     Today's Visits  Date Type Provider Dept   05/11/21 Telephone Lizette Higuera, [address] Primary Care   05/11/21 Office Visit Kolton Wheat, [address] [facility] [address] Primary Care   Showing today's visits and meeting all other requirements     Future Appointments  No visits were found meeting these conditions  Showing future appointments within next 150 days and meeting all other requirements        The patient was identified by name and date of birth  Birgit Hayes was informed that this is a telemedicine visit and that the visit is being conducted through [address] [place] [address] [facility] and patient was informed that this is a secure, HIPAA-compliant platform  She agrees to proceed     My office door was closed  No one else was in the room  She acknowledged consent and understanding of privacy and security of the video platform  The patient has agreed to participate and understands they can discontinue the visit at any time  Patient is aware this is a billable service  Charito Chopra is a 32 y o  female [facility] [phone]  tail bone pain x 1-2 days painful to sit and walk   not accidnet related worse Sunday, treated for strep and no st  Hard to lat down and even to touch, no discharge  No fever         [facility] [phone]  tail bone pain x 1-2 days painful to sit and walk   not accidnet related  Did not fall  Does have a hx of low back pain  No discharge from tailbone  Was carrying son 32 pounds and for 1 1/2 hours  May have strained low back  Past Medical History:   Diagnosis Date    Acne     was on Amoxicillin d/c once she found out she was pregnant    Angioma     developed during pregnancy    Asthma     exercise induced asthma no current inhaler    Migraine     Normal delivery     2016 daughter    Varicella     ? Past Surgical History:   Procedure Laterality Date    TOOTH EXTRACTION         Current Outpatient Medications   Medication Sig Dispense Refill    amoxicillin (AMOXIL) 500 mg capsule Take 1 capsule (500 mg total) by mouth every 8 (eight) hours for 10 days 30 capsule 0    citalopram (CeleXA) 20 mg tablet TAKE 1 TO 2 TABLETS BY MOUTH EVERY  tablet 3    ibuprofen (MOTRIN) 600 mg tablet Take 1 tablet (600 mg total) by mouth every 6 (six) hours as needed for mild pain 30 tablet 0    meloxicam (MOBIC) 7 5 mg tablet Take 1 tablet (7 5 mg total) by mouth daily 30 tablet 0    norethindrone (MICRONOR) 0 35 MG tablet Take 1 tablet (0 35 mg total) by mouth daily 90 tablet 3    Prenatal Multivit-Min-Fe-FA (PRENATAL #2 PO) Take 1 tablet by mouth daily  No current facility-administered medications for this visit  Allergies   Allergen Reactions    Pollen Extract Sneezing       Review of Systems   Constitutional: Negative  HENT: Negative  Eyes: Negative  Respiratory: Negative  Cardiovascular: Negative  Gastrointestinal: Negative  Endocrine: Negative  Genitourinary: Negative  Musculoskeletal: Positive for back pain (tailbone pain)  Skin: Negative  Allergic/Immunologic: Negative  Neurological: Negative  Hematological: Negative  Psychiatric/Behavioral: Negative  Video Exam    There were no vitals filed for this visit  Physical Exam  Constitutional:       Appearance: Normal appearance  HENT:      Head: Normocephalic and atraumatic  Eyes:      Conjunctiva/sclera: Conjunctivae normal    Pulmonary:      Effort: Pulmonary effort is normal  No respiratory distress  Musculoskeletal:      Comments: Pain in coccyx when sitting   Skin:     Coloration: Skin is not pale  Neurological:      General: No focal deficit present  Mental Status: She is alert and oriented to person, place, and time  Psychiatric:         Mood and Affect: Mood normal          Behavior: Behavior normal          Thought Content: Thought content normal          Judgment: Judgment normal           I spent 15 minutes directly with the patient during this visit    Patient Instructions   [facility] [phone]  tail bone pain x 1-2 days painful to sit and walk   not accidnet related  Rec starting trial of meloxicam 7 5 mg once daily prn pain with food  May take 2 po qd if in severe pain with food, Do not sit for extended periods of time if in pain in coccyx area, call if worse or not better and consider coccyx and LS spine xray if not improved  Denied falling and injuring tailbone  Does have a hx of chronic low back pain  VIRTUAL VISIT DISCLAIMER    Shawna Olivares acknowledges that she has consented to an online visit or consultation  She understands that the online visit is based solely on information provided by her, and that, in the absence of a face-to-face physical evaluation by the physician, the diagnosis she receives is both limited and provisional in terms of accuracy and completeness  This is not intended to replace a full medical face-to-face evaluation by the physician  Shawna Olivares understands and accepts these terms

## 2021-05-12 LAB — BACTERIA THROAT CULT: NORMAL

## 2021-06-03 DIAGNOSIS — M53.3 COCCYX PAIN: ICD-10-CM

## 2021-06-03 RX ORDER — MELOXICAM 7.5 MG/1
TABLET ORAL
Qty: 30 TABLET | Refills: 0 | Status: SHIPPED | OUTPATIENT
Start: 2021-06-03 | End: 2022-01-21

## 2021-08-24 ENCOUNTER — ANNUAL EXAM (OUTPATIENT)
Dept: OBGYN CLINIC | Facility: CLINIC | Age: 31
End: 2021-08-24
Payer: COMMERCIAL

## 2021-08-24 VITALS
BODY MASS INDEX: 28.7 KG/M2 | HEIGHT: 66 IN | SYSTOLIC BLOOD PRESSURE: 110 MMHG | WEIGHT: 178.6 LBS | DIASTOLIC BLOOD PRESSURE: 62 MMHG

## 2021-08-24 DIAGNOSIS — Z01.419 ENCNTR FOR GYN EXAM (GENERAL) (ROUTINE) W/O ABN FINDINGS: Primary | ICD-10-CM

## 2021-08-24 DIAGNOSIS — F41.9 ANXIETY: ICD-10-CM

## 2021-08-24 DIAGNOSIS — Z30.09 BIRTH CONTROL COUNSELING: ICD-10-CM

## 2021-08-24 PROCEDURE — 3008F BODY MASS INDEX DOCD: CPT | Performed by: PHYSICIAN ASSISTANT

## 2021-08-24 PROCEDURE — 99395 PREV VISIT EST AGE 18-39: CPT | Performed by: PHYSICIAN ASSISTANT

## 2021-08-24 PROCEDURE — 1036F TOBACCO NON-USER: CPT | Performed by: PHYSICIAN ASSISTANT

## 2021-08-24 RX ORDER — IBUPROFEN 600 MG/1
600 TABLET ORAL EVERY 6 HOURS PRN
Qty: 180 TABLET | Refills: 3 | Status: SHIPPED | OUTPATIENT
Start: 2021-08-24

## 2021-08-24 RX ORDER — CITALOPRAM 20 MG/1
TABLET ORAL
Qty: 180 TABLET | Refills: 3 | Status: SHIPPED | OUTPATIENT
Start: 2021-08-24 | End: 2022-06-07 | Stop reason: SDUPTHER

## 2021-08-24 RX ORDER — ACETAMINOPHEN AND CODEINE PHOSPHATE 120; 12 MG/5ML; MG/5ML
1 SOLUTION ORAL DAILY
Qty: 84 TABLET | Refills: 4 | Status: SHIPPED | OUTPATIENT
Start: 2021-08-24

## 2021-08-24 NOTE — PROGRESS NOTES
Jojo Vela  1990      CC:  Yearly exam    S:  32 y o  female here for yearly exam      Sexual activity: She is sexually active without pain, bleeding or dryness  Contraception: She uses Yamel for contraception  She has some irregular bleeding with this, but she remains uninterested in another method; they will try for a pregnancy in about a year  Last Pap 5/22/18 neg    We reviewed [facility] guidelines for Pap testing today  Family hx of breast cancer: no  Family hx of ovarian cancer: no  Family hx of colon cancer: no      Current Outpatient Medications:     citalopram (CeleXA) 20 mg tablet, TAKE 1 TO 2 TABLETS BY MOUTH EVERY DAY, Disp: 180 tablet, Rfl: 3    ibuprofen (MOTRIN) 600 mg tablet, Take 1 tablet (600 mg total) by mouth every 6 (six) hours as needed for mild pain, Disp: 180 tablet, Rfl: 3    meloxicam (MOBIC) 7 5 mg tablet, TAKE 1 TABLET BY MOUTH EVERY DAY, Disp: 30 tablet, Rfl: 0    norethindrone (Sharobel) 0 35 MG tablet, Take 1 tablet (0 35 mg total) by mouth daily, Disp: 84 tablet, Rfl: 4    Prenatal Multivit-Min-Fe-FA (PRENATAL #2 PO), Take 1 tablet by mouth daily  , Disp: , Rfl:   Social History     Socioeconomic History    Marital status: /Civil Union     Spouse name: Chip Infante Number of children: Not on file    Years of education: Not on file    Highest education level: Not on file   Occupational History    Occupation: Teacher     Comment: [employer]   Tobacco Use    Smoking status: Never Smoker    Smokeless tobacco: Never Used   Vaping Use    Vaping Use: Never used   Substance and Sexual Activity    Alcohol use: Yes     Comment: social alcohol use per allscripts    Drug use: No    Sexual activity: Yes     Partners: Male     Birth control/protection: OCP   Other Topics Concern    Not on file   Social History Narrative    Always uses seat belt    Daily caffeinated coffee consumption    Exercises regularly     Social Determinants of Health Financial Resource Strain:     Difficulty of Paying Living Expenses:    Food Insecurity:     Worried About Running Out of Food in the Last Year:     [address] in the Last Year:    Transportation Needs:     Lack of Transportation (Medical):  Lack of Transportation (Non-Medical):    Physical Activity:     Days of Exercise per Week:     Minutes of Exercise per Session:    Stress:     Feeling of Stress :    Social Connections:     Frequency of Communication with Friends and Family:     Frequency of Social Gatherings with Friends and Family:     Attends Restorationist Services:     Active Member of Clubs or Organizations:     Attends Club or Organization Meetings:     Marital Status:    Intimate Partner Violence:     Fear of Current or Ex-Partner:     Emotionally Abused:     Physically Abused:     Sexually Abused:      Family History   Problem Relation Age of Onset    Cancer Mother         hodgkins lymphome    No Known Problems Father     No Known Problems Sister     No Known Problems Brother     Cancer Maternal Grandmother         pancreatic    Heart disease Paternal Grandfather         CHF    No Known Problems Daughter       Past Medical History:   Diagnosis Date    Acne     was on Amoxicillin d/c once she found out she was pregnant    Angioma     developed during pregnancy    Asthma     exercise induced asthma no current inhaler    Migraine     Normal delivery     2016 daughter   Levora Patiño Varicella     ? Review of Systems   Respiratory: Negative  Cardiovascular: Negative  Gastrointestinal: Negative for constipation and diarrhea  Genitourinary: Negative for difficulty urinating, pelvic pain, vaginal bleeding, vaginal discharge, itching or odor  O:  Blood pressure 110/62, height 5' 5 75" (1 67 m), weight 81 kg (178 lb 9 6 oz), last menstrual period 07/28/2021, currently breastfeeding      Patient appears well and is not in distress  Neck is supple without masses  Breasts are symmetrical without mass, tenderness, nipple discharge, skin changes or adenopathy  Abdomen is soft and nontender without masses  External genitals are normal without lesions or rashes  Urethral meatus and urethra are normal  Bladder is normal to palpation  Vagina is normal without discharge or bleeding  Cervix is normal without discharge or lesion  Uterus is normal, mobile, nontender without palpable mass  Adnexa are normal, nontender, without palpable mass  A:  Yearly exam      P:   Pap and HPV today    Yamel, ibuprofen, Celexa sent to pharmacy     RTO one year for yearly exam or sooner as needed

## 2021-08-27 LAB
CLINICAL INFO: NORMAL
CYTO CVX: NORMAL
DATE PREVIOUS BX: NORMAL
HPV E6+E7 MRNA CVX QL NAA+PROBE: NOT DETECTED
LMP START DATE: NORMAL
SL AMB PREV. PAP:: NORMAL
SPECIMEN SOURCE CVX/VAG CYTO: NORMAL

## 2022-01-14 ENCOUNTER — APPOINTMENT (OUTPATIENT)
Dept: RADIOLOGY | Facility: MEDICAL CENTER | Age: 32
End: 2022-01-14
Payer: COMMERCIAL

## 2022-01-14 ENCOUNTER — OFFICE VISIT (OUTPATIENT)
Dept: FAMILY MEDICINE CLINIC | Facility: CLINIC | Age: 32
End: 2022-01-14
Payer: COMMERCIAL

## 2022-01-14 VITALS
WEIGHT: 179 LBS | BODY MASS INDEX: 28.77 KG/M2 | DIASTOLIC BLOOD PRESSURE: 74 MMHG | HEIGHT: 66 IN | OXYGEN SATURATION: 98 % | TEMPERATURE: 97.1 F | HEART RATE: 78 BPM | SYSTOLIC BLOOD PRESSURE: 114 MMHG

## 2022-01-14 DIAGNOSIS — M54.41 LOW BACK PAIN WITH RIGHT-SIDED SCIATICA, UNSPECIFIED BACK PAIN LATERALITY, UNSPECIFIED CHRONICITY: ICD-10-CM

## 2022-01-14 DIAGNOSIS — R07.9 CHEST PAIN, UNSPECIFIED TYPE: ICD-10-CM

## 2022-01-14 DIAGNOSIS — M54.41 LOW BACK PAIN WITH RIGHT-SIDED SCIATICA, UNSPECIFIED BACK PAIN LATERALITY, UNSPECIFIED CHRONICITY: Primary | ICD-10-CM

## 2022-01-14 DIAGNOSIS — R10.10 UPPER ABDOMINAL PAIN: ICD-10-CM

## 2022-01-14 PROCEDURE — 71046 X-RAY EXAM CHEST 2 VIEWS: CPT

## 2022-01-14 PROCEDURE — 99214 OFFICE O/P EST MOD 30 MIN: CPT | Performed by: FAMILY MEDICINE

## 2022-01-14 PROCEDURE — 3725F SCREEN DEPRESSION PERFORMED: CPT | Performed by: FAMILY MEDICINE

## 2022-01-14 PROCEDURE — 72110 X-RAY EXAM L-2 SPINE 4/>VWS: CPT

## 2022-01-14 RX ORDER — SPIRONOLACTONE 100 MG/1
100 TABLET, FILM COATED ORAL EVERY EVENING
COMMUNITY
Start: 2022-01-13

## 2022-01-14 RX ORDER — PREDNISONE 10 MG/1
TABLET ORAL
Qty: 21 TABLET | Refills: 0 | Status: SHIPPED | OUTPATIENT
Start: 2022-01-14

## 2022-01-14 NOTE — PROGRESS NOTES
Assessment/Plan:  Chief Complaint   Patient presents with    Back Pain     Right sided low back pain which is getting worse  Also if she bends over or crouches down she has a painful feeling in her very upper abdomen lower chest area  Patient Instructions   Here for low back pain with radiation to right buttock  Rec prednisone and also rec xray low back and orthopedic consult for low back pain and right sciatica complaints  Rec also losing weight to help with any low back pain  Patient also with increasing abdominal pain with bending over in midepigastric and lower chest area  Check xray of chest  This may be a hiatal hernia and will rec GI as directed  Gilbert if worse  Rec not bending over to help decrease pain in uppder abdomen  No problem-specific Assessment & Plan notes found for this encounter  Diagnoses and all orders for this visit:    Low back pain with right-sided sciatica, unspecified back pain laterality, unspecified chronicity  -     XR spine lumbar 2 or 3 views injury; Future  -     predniSONE 10 mg tablet; Take 60 mg po day#1, 50 mg po day#2, 40 mg po day#3, 30 mg po day#4, 20 mg po day#5m and 10 mg po day#6  -     Ambulatory Referral to Orthopedic Surgery; Future    Chest pain, unspecified type  -     XR chest pa & lateral; Future  -     Ambulatory Referral to Gastroenterology; Future    Upper abdominal pain  -     Ambulatory Referral to Gastroenterology; Future    Other orders  -     spironolactone (ALDACTONE) 100 mg tablet; Take 100 mg by mouth every evening          Subjective:      Patient ID: Shawna Olivares is a 32 y o  female  Back Pain (Right sided low back pain which is getting worse  Also if she bends over or crouches down she has a painful feeling in her very upper abdomen lower chest area )      2 1/2 months of low back pain and denies blood in stool or urine  Patient played volleyball growing up competitively  No loss of bowel or bladder function         The following portions of the patient's history were reviewed and updated as appropriate: allergies, current medications, past family history, past medical history, past social history, past surgical history and problem list     Review of Systems   Constitutional: Negative  HENT: Negative  Eyes: Negative  Respiratory: Negative  Cardiovascular: Negative  Gastrointestinal: Positive for abdominal pain (only when bending over  )  Endocrine: Negative  Genitourinary: Negative  Musculoskeletal: Positive for back pain (low back pain)  Skin: Negative  Allergic/Immunologic: Negative  Neurological: Negative  Hematological: Negative  Psychiatric/Behavioral: Negative  Objective:      /74   Pulse 78   Temp (!) 97 1 °F (36 2 °C) (Temporal)   Ht 5' 5 75" (1 67 m)   Wt 81 2 kg (179 lb)   SpO2 98%   BMI 29 11 kg/m²          Physical Exam  Constitutional:       Appearance: She is well-developed  HENT:      Head: Normocephalic and atraumatic  Eyes:      Conjunctiva/sclera: Conjunctivae normal       Pupils: Pupils are equal, round, and reactive to light  Cardiovascular:      Rate and Rhythm: Normal rate and regular rhythm  Heart sounds: Normal heart sounds  Pulmonary:      Effort: Pulmonary effort is normal       Breath sounds: Normal breath sounds  Abdominal:      General: Abdomen is flat  Bowel sounds are normal       Palpations: Abdomen is soft  Musculoskeletal:      Cervical back: Normal range of motion and neck supple  Comments: Low back pain with flexion with pain into buttock on right     Skin:     General: Skin is warm and dry  Neurological:      Mental Status: She is alert and oriented to person, place, and time  Deep Tendon Reflexes: Reflexes are normal and symmetric     Psychiatric:         Behavior: Behavior normal

## 2022-01-14 NOTE — PATIENT INSTRUCTIONS
Here for low back pain with radiation to right buttock  Rec prednisone and also rec xray low back and orthopedic consult for low back pain and right sciatica complaints  Rec also losing weight to help with any low back pain  Patient also with increasing abdominal pain with bending over in midepigastric and lower chest area  Check xray of chest  This may be a hiatal hernia and will rec GI as directed  Gilbert if worse  Rec not bending over to help decrease pain in uppder abdomen

## 2022-01-17 ENCOUNTER — CONSULT (OUTPATIENT)
Dept: GASTROENTEROLOGY | Facility: MEDICAL CENTER | Age: 32
End: 2022-01-17
Payer: COMMERCIAL

## 2022-01-17 VITALS
BODY MASS INDEX: 29.31 KG/M2 | DIASTOLIC BLOOD PRESSURE: 76 MMHG | SYSTOLIC BLOOD PRESSURE: 114 MMHG | WEIGHT: 180.2 LBS | TEMPERATURE: 97.9 F | HEART RATE: 78 BPM

## 2022-01-17 DIAGNOSIS — R10.10 UPPER ABDOMINAL PAIN: ICD-10-CM

## 2022-01-17 DIAGNOSIS — R07.9 CHEST PAIN, UNSPECIFIED TYPE: ICD-10-CM

## 2022-01-17 DIAGNOSIS — R10.13 EPIGASTRIC PAIN: Primary | ICD-10-CM

## 2022-01-17 PROCEDURE — 99243 OFF/OP CNSLTJ NEW/EST LOW 30: CPT | Performed by: INTERNAL MEDICINE

## 2022-01-17 NOTE — PROGRESS NOTES
[facility] - Outpatient Consultation  Sherly Enciso 32 y o  female MRN: [id number]  Encounter: [id number]    HPI:    Sherly Enciso is a 32 y o  female who presents with complaint of epigastric pain  She has had cramping epigastric pain for 2 months associated with bending and crouching, and it is getting worse  The pain is positional and unrelated to eating  She has also also developed new symptoms of reflux, some regurgitation, and burping  No burning in the chest   No swallowing issues  No early satiety  No abdominal surgeries  No prior EGD  Not on GERD medications  Takes ibuprofen for lower back pain which has been going on for ~1 year  Typically takes ibuprofen 600 mg daily  No FH of upper GI cancers  Now eight loss  Has gained 20-25 lbs over the past 6 years  Does not smoke  REVIEW OF SYSTEMS:  CONSTITUTIONAL: Denies any fever, chills, rigors, and weight loss  HEENT: No earache or tinnitus  Denies hearing loss or visual disturbances  CARDIOVASCULAR: No chest pain or palpitations  RESPIRATORY: Denies any cough, hemoptysis, shortness of breath or dyspnea on exertion  GASTROINTESTINAL: As noted in the History of Present Illness  GENITOURINARY: No problems with urination  Denies any hematuria or dysuria  NEUROLOGIC: No dizziness or vertigo, denies headaches  MUSCULOSKELETAL: Denies any muscle or joint pain  SKIN: Denies skin rashes or itching  ENDOCRINE: Denies excessive thirst  Denies intolerance to heat or cold  PSYCHOSOCIAL: Denies depression or anxiety  Denies any recent memory loss  Historical Information   Past Medical History:   Diagnosis Date    Acne     was on Amoxicillin d/c once she found out she was pregnant    Angioma     developed during pregnancy    Asthma     exercise induced asthma no current inhaler    Migraine     Normal delivery     2016 daughter    Varicella     ?      Past Surgical History:   Procedure Laterality Date    TOOTH EXTRACTION       Social History   Social History     Substance and Sexual Activity   Alcohol Use Yes    Comment: social alcohol use per allscripts     Social History     Substance and Sexual Activity   Drug Use No     Social History     Tobacco Use   Smoking Status Never Smoker   Smokeless Tobacco Never Used     Family History   Problem Relation Age of Onset    Cancer Mother         hodgkins lymphome    No Known Problems Father     No Known Problems Sister     No Known Problems Brother     Cancer Maternal Grandmother         pancreatic    Heart disease Paternal Grandfather         CHF    No Known Problems Daughter        Meds/Allergies       Current Outpatient Medications:     citalopram (CeleXA) 20 mg tablet    ibuprofen (MOTRIN) 600 mg tablet    meloxicam (MOBIC) 7 5 mg tablet    norethindrone (Sharobel) 0 35 MG tablet    predniSONE 10 mg tablet    Prenatal Multivit-Min-Fe-FA (PRENATAL #2 PO)    spironolactone (ALDACTONE) 100 mg tablet    Allergies   Allergen Reactions    Pollen Extract Sneezing       Objective   Blood pressure 114/76, pulse 78, temperature 97 9 °F (36 6 °C), temperature source Probe, weight 81 7 kg (180 lb 3 2 oz), currently breastfeeding  Body mass index is 29 31 kg/m²  PHYSICAL EXAM:    General Appearance:   Alert, cooperative, no distress   HEENT:   Normocephalic, atraumatic, anicteric  Neck:  Supple, symmetrical, trachea midline   Lungs:   Clear to auscultation bilaterally; no rales, rhonchi or wheezing; respirations unlabored    Heart[de-identified]   Regular rate and rhythm; no murmur, rub, or gallop     Abdomen:   Soft, significant epigastric ttp, non-distended; normal bowel sounds; no masses, no organomegaly    Genitalia:   Deferred    Rectal:   Deferred    Extremities:  No cyanosis, clubbing or edema    Pulses:  2+ and symmetric    Skin:  No jaundice, rashes, or lesions    Lymph nodes:  No palpable cervical lymphadenopathy        Lab Results:   No visits with results within 1 Day(s) from this visit  Latest known visit with results is:   Annual Exam on 08/24/2021   Component Date Value    SL AMB CLINICAL INFORMAT* 08/24/2021 None given     LMP 08/24/2021 None given     Prev  PAP 08/24/2021 None given     Prev  BX 08/24/2021 None given     Source 08/24/2021 None given     Statement of Adequacy 08/24/2021      Interpretation/Result 08/24/2021 Negative for intraepithelial lesion or malignancy   Comment 08/24/2021      SL AMB CYTOTECHNOLOGIST: 08/24/2021      Review Cytotechnologist 08/24/2021      Comment 08/24/2021      HPV mRNA E6/E7 08/24/2021 Not Detected        Lab Results   Component Value Date    WBC 10 63 (H) 11/26/2018    HGB 12 3 11/26/2018    HCT 37 1 11/26/2018    MCV 92 11/26/2018     11/26/2018       Lab Results   Component Value Date    SODIUM 137 03/05/2020    K 4 3 03/05/2020     03/05/2020    CO2 28 03/05/2020    AGAP 5 03/05/2020    BUN 13 03/05/2020    CREATININE 0 65 03/05/2020    GLUF 78 03/05/2020    CALCIUM 8 9 03/05/2020    AST 19 03/05/2020    ALT 33 03/05/2020    ALKPHOS 65 03/05/2020    TP 8 0 03/05/2020    TBILI 0 69 03/05/2020    EGFR 120 03/05/2020       No results found for: CRP    Lab Results   Component Value Date    HWH8PPZQHDIQ 1 720 03/05/2020       No results found for: IRON, TIBC, FERRITIN    Radiology Results:   No results found  ______________________________________________________________________  ASSESSMENT AND PLAN:    Luis Velazquez is a 32 y o  female who presents with new onset of epigastric pain and reflux symptoms  These have been going on for 2 months and worsening  She takes daily NSDAIDs  She had significant epigastric ttp on exam   The positional nature of her pain could indicate a hernia, although I did not appreciate abdominal wall defect on exam   Other possibilities are PUD, H  pylori infection, large hiatal hernia  I discussed these possibilities with her   We will start with expedited EGD to evaluated for gastirtis, ulcers, H  pylori  I discussed the risks of bleeding, infection, and perforation associated with endoscopic procedures  If EGD is unrevealing, we will proceed with imaging studies  I asked her to minimize NSAID use at this time  We will consider PPI after her EGD  1  Epigastric pain    2  Chest pain, unspecified type    3   Upper abdominal pain        Orders Placed This Encounter   Procedures    EGD

## 2022-01-17 NOTE — PATIENT INSTRUCTIONS
Scheduled date of EGD (as of today) 1/20/22  Physician performing Dr Christopher Mcgee  Location of procedure  [facility]  Bowel prep reviewed with patient: naseem  Instructions reviewed with patient by:  Ma  Clearances: naseem

## 2022-01-19 ENCOUNTER — TELEPHONE (OUTPATIENT)
Dept: GASTROENTEROLOGY | Facility: MEDICAL CENTER | Age: 32
End: 2022-01-19

## 2022-01-20 ENCOUNTER — HOSPITAL ENCOUNTER (OUTPATIENT)
Dept: GASTROENTEROLOGY | Facility: MEDICAL CENTER | Age: 32
Setting detail: OUTPATIENT SURGERY
Discharge: HOME | End: 2022-01-20
Payer: COMMERCIAL

## 2022-01-20 ENCOUNTER — ANESTHESIA (OUTPATIENT)
Dept: GASTROENTEROLOGY | Facility: MEDICAL CENTER | Age: 32
End: 2022-01-20

## 2022-01-20 ENCOUNTER — ANESTHESIA EVENT (OUTPATIENT)
Dept: GASTROENTEROLOGY | Facility: MEDICAL CENTER | Age: 32
End: 2022-01-20

## 2022-01-20 VITALS
RESPIRATION RATE: 12 BRPM | HEIGHT: 66 IN | HEART RATE: 70 BPM | OXYGEN SATURATION: 98 % | SYSTOLIC BLOOD PRESSURE: 110 MMHG | DIASTOLIC BLOOD PRESSURE: 56 MMHG | BODY MASS INDEX: 29.09 KG/M2 | TEMPERATURE: 99.1 F

## 2022-01-20 DIAGNOSIS — R10.13 EPIGASTRIC PAIN: ICD-10-CM

## 2022-01-20 LAB
EXT PREGNANCY TEST URINE: NEGATIVE
EXT. CONTROL: NORMAL

## 2022-01-20 PROCEDURE — 43239 EGD BIOPSY SINGLE/MULTIPLE: CPT | Performed by: INTERNAL MEDICINE

## 2022-01-20 PROCEDURE — 81025 URINE PREGNANCY TEST: CPT | Performed by: PAIN MEDICINE

## 2022-01-20 PROCEDURE — 88305 TISSUE EXAM BY PATHOLOGIST: CPT | Performed by: PATHOLOGY

## 2022-01-20 RX ORDER — SODIUM CHLORIDE 9 MG/ML
125 INJECTION, SOLUTION INTRAVENOUS CONTINUOUS
Status: DISCONTINUED | OUTPATIENT
Start: 2022-01-20 | End: 2022-01-24 | Stop reason: HOSPADM

## 2022-01-20 RX ORDER — LIDOCAINE HYDROCHLORIDE 20 MG/ML
INJECTION, SOLUTION EPIDURAL; INFILTRATION; INTRACAUDAL; PERINEURAL AS NEEDED
Status: DISCONTINUED | OUTPATIENT
Start: 2022-01-20 | End: 2022-01-20

## 2022-01-20 RX ORDER — MIDAZOLAM HYDROCHLORIDE 2 MG/2ML
INJECTION, SOLUTION INTRAMUSCULAR; INTRAVENOUS AS NEEDED
Status: DISCONTINUED | OUTPATIENT
Start: 2022-01-20 | End: 2022-01-20

## 2022-01-20 RX ORDER — PROPOFOL 10 MG/ML
INJECTION, EMULSION INTRAVENOUS AS NEEDED
Status: DISCONTINUED | OUTPATIENT
Start: 2022-01-20 | End: 2022-01-20

## 2022-01-20 RX ADMIN — MIDAZOLAM 2 MG: 1 INJECTION INTRAMUSCULAR; INTRAVENOUS at 12:32

## 2022-01-20 RX ADMIN — PROPOFOL 50 MG: 10 INJECTION, EMULSION INTRAVENOUS at 12:37

## 2022-01-20 RX ADMIN — PROPOFOL 150 MG: 10 INJECTION, EMULSION INTRAVENOUS at 12:36

## 2022-01-20 RX ADMIN — PROPOFOL 50 MG: 10 INJECTION, EMULSION INTRAVENOUS at 12:38

## 2022-01-20 RX ADMIN — LIDOCAINE HYDROCHLORIDE 100 MG: 20 INJECTION, SOLUTION EPIDURAL; INFILTRATION; INTRACAUDAL; PERINEURAL at 12:36

## 2022-01-20 RX ADMIN — SODIUM CHLORIDE 125 ML/HR: 0.9 INJECTION, SOLUTION INTRAVENOUS at 11:32

## 2022-01-20 NOTE — H&P
H&P EXAM - Outpatient Endoscopy   David Muro 32 y o  female MRN: [id number]    [facility] PRE[facility]   Encounter: [id number]        [facility] and Physical -  [facility]  David Muro 32 y o  female MRN: [id number]                  HPI: David Muor is a 32y o  year old female who presents for abdominal pain, epigastric pain, chest pain      REVIEW OF SYSTEMS: Per the HPI, and otherwise unremarkable  Historical Information   Past Medical History:   Diagnosis Date    Acne     was on Amoxicillin d/c once she found out she was pregnant    Angioma     developed during pregnancy    Asthma     exercise induced asthma no current inhaler    Migraine     Normal delivery     2016 daughter    Varicella     ? Past Surgical History:   Procedure Laterality Date    TOOTH EXTRACTION       Social History   Social History     Substance and Sexual Activity   Alcohol Use Yes    Comment: social alcohol use per allscripts     Social History     Substance and Sexual Activity   Drug Use No     Social History     Tobacco Use   Smoking Status Never Smoker   Smokeless Tobacco Never Used     Family History   Problem Relation Age of Onset    Cancer Mother         hodgkins lymphome    No Known Problems Father     No Known Problems Sister     No Known Problems Brother     Cancer Maternal Grandmother         pancreatic    Heart disease Paternal Grandfather         CHF    No Known Problems Daughter        Meds/Allergies     (Not in a hospital admission)      Allergies   Allergen Reactions    Pollen Extract Sneezing       Objective     /76   Pulse 63   Temp 99 1 °F (37 3 °C) (Temporal)   Resp 20   Ht 5' 6" (1 676 m)   SpO2 98%   BMI 29 09 kg/m²       PHYSICAL EXAM    Gen: NAD  CV: RRR  CHEST: Clear  ABD: soft, NT/ND  EXT: no edema      ASSESSMENT/PLAN:  This is a 32y o  year old female here for egd, and she is stable and optimized for her procedure

## 2022-01-20 NOTE — ANESTHESIA PREPROCEDURE EVALUATION
Procedure:  EGD    Relevant Problems   CARDIO   (+) Migraine with aura      MUSCULOSKELETAL   (+) Acute midline low back pain with right-sided sciatica      NEURO/PSYCH   (+) Anxiety   (+) Migraine with aura        Physical Exam    Airway    Mallampati score: II  TM Distance: >3 FB  Neck ROM: full     Dental   No notable dental hx     Cardiovascular  Rhythm: regular, Rate: normal, Cardiovascular exam normal    Pulmonary  Pulmonary exam normal Breath sounds clear to auscultation,     Other Findings        Anesthesia Plan  ASA Score- 2     Anesthesia Type- IV sedation with anesthesia with ASA Monitors  Additional Monitors:   Airway Plan:           Plan Factors-Exercise tolerance (METS): >4 METS  Chart reviewed  Patient summary reviewed  Patient is not a current smoker  Patient did not smoke on day of surgery  Induction- intravenous  Postoperative Plan-     Informed Consent- Anesthetic plan and risks discussed with patient

## 2022-01-21 ENCOUNTER — OFFICE VISIT (OUTPATIENT)
Dept: OBGYN CLINIC | Facility: MEDICAL CENTER | Age: 32
End: 2022-01-21
Payer: COMMERCIAL

## 2022-01-21 VITALS
HEIGHT: 66 IN | DIASTOLIC BLOOD PRESSURE: 83 MMHG | BODY MASS INDEX: 29.12 KG/M2 | TEMPERATURE: 98.2 F | WEIGHT: 181.2 LBS | HEART RATE: 86 BPM | SYSTOLIC BLOOD PRESSURE: 119 MMHG

## 2022-01-21 DIAGNOSIS — M54.41 CHRONIC RIGHT-SIDED LOW BACK PAIN WITH RIGHT-SIDED SCIATICA: Primary | ICD-10-CM

## 2022-01-21 DIAGNOSIS — G89.29 CHRONIC RIGHT-SIDED LOW BACK PAIN WITH RIGHT-SIDED SCIATICA: Primary | ICD-10-CM

## 2022-01-21 PROCEDURE — 3008F BODY MASS INDEX DOCD: CPT | Performed by: STUDENT IN AN ORGANIZED HEALTH CARE EDUCATION/TRAINING PROGRAM

## 2022-01-21 PROCEDURE — 1036F TOBACCO NON-USER: CPT | Performed by: STUDENT IN AN ORGANIZED HEALTH CARE EDUCATION/TRAINING PROGRAM

## 2022-01-21 PROCEDURE — 99244 OFF/OP CNSLTJ NEW/EST MOD 40: CPT | Performed by: STUDENT IN AN ORGANIZED HEALTH CARE EDUCATION/TRAINING PROGRAM

## 2022-01-21 RX ORDER — MELOXICAM 7.5 MG/1
7.5 TABLET ORAL DAILY
Qty: 30 TABLET | Refills: 1 | Status: SHIPPED | OUTPATIENT
Start: 2022-01-21

## 2022-01-21 NOTE — PROGRESS NOTES
1  Chronic right-sided low back pain with right-sided sciatica  Ambulatory Referral to Orthopedic Surgery    meloxicam (Mobic) 7 5 mg tablet    Ambulatory Referral to Comprehensive Spine [facility]     Orders Placed This Encounter   Procedures    Ambulatory Referral to [facility]        Imaging Studies (I personally reviewed images on PACS):     X-ray lumbar spine 01/14/2022:  No acute osseous abnormalities or destructive osseous lesions  Moderate degenerative/spondylotic changes of L5-S1    IMPRESSION:   Acute midline/right sided lumbar pain without a precipitating injury-no red flags on exam today and neurologically intact  No bowel/bladder incontinence   Lumbar spondylosis of L5-S1      PLAN:     Clinical exam and radiographic imaging reviewed with patient today, with impression as per above  I have discussed with the patient the pathophysiology of this diagnosis and reviewed how the examination correlates with this diagnosis   Referred to comprehensive Spine PT-recommended at least 6 weeks of formal therapy before transitioning to a home exercise program at minimum   In regards to pain control, I have prescribed her meloxicam 7 5 mg p o  1-2 tabs daily as needed in regards to pain  I counseled not to concurrently take other NSAIDs while on meloxicam with that she could take acetaminophen 500-1000 mg p o  Q 6-8 hours p r n  [place]  I recommended purchasing a TENs machine unit that she can use 3 times a day for 15 minutes sessions  Return in about 6 weeks (around 3/4/2022)  CHIEF COMPLAINT:  Chief Complaint   Patient presents with    Lower Back - Pain         HPI:  Virgil Lara is a 32 y o  female  who presents in regard sto referral from [address] [place] [address] for       Visit 1/21/2022 :  Initial evaluation of back pain:  Patient reports has been ongoing issue for the past 3 months without a precipitating injury    She states she has had off and on back pains in the past and previously played volleyball when she was younger  She states pain is located in the midline and right paralumbar aspect of her back  She describes as a sharp pain, constant and aggravated with range of motion movements as well as lifting/pushing/pulling  She states intermittently he can radiate down her right lower extremity to her foot but denies any numbness/tingling of her lower extremities, balance issues  She denies bowel/bladder incontinence  She had seen her PCP for this issue on 01/14/2022 and has imaging as noted above  PCP note reviewed  Regards to treatments, she has been taking ibuprofen which does provide relief  She has not seen formal physical therapy for this issue before  She denies prior injury/surgeries of her lumbar/low back in the past   She states this pain does not wake her up at night  She denies fevers/chills  Medical, Surgical, Family, and Social History    Past Medical History:   Diagnosis Date    Acne     was on Amoxicillin d/c once she found out she was pregnant    Angioma     developed during pregnancy    Asthma     exercise induced asthma no current inhaler    Migraine     Normal delivery     2016 daughter   Arnav Per Varicella     ?      Past Surgical History:   Procedure Laterality Date    TOOTH EXTRACTION       Social History   Social History     Substance and Sexual Activity   Alcohol Use Yes    Comment: social alcohol use per allscripts     Social History     Substance and Sexual Activity   Drug Use No     Social History     Tobacco Use   Smoking Status Never Smoker   Smokeless Tobacco Never Used     Family History   Problem Relation Age of Onset    Cancer Mother         hodgkins lymphome    No Known Problems Father     No Known Problems Sister     No Known Problems Brother     Cancer Maternal Grandmother         pancreatic    Heart disease Paternal Grandfather         CHF    No Known Problems Daughter      Allergies   Allergen Reactions    Pollen Extract Sneezing Physical Exam  /83   Pulse 86   Temp 98 2 °F (36 8 °C)   Ht 5' 6" (1 676 m)   Wt 82 2 kg (181 lb 3 2 oz)   BMI 29 25 kg/m²     Constitutional:  see vital signs  Gen: well-developed, normocephalic/atraumatic, well-groomed  Eyes: No inflammation or discharge of conjunctiva or lids; sclera clear   Pharynx: no inflammation, lesion, or mass of lips  Neck: supple, no masses, non-distended  MSK: no inflammation, lesion, mass, or clubbing of nails and digits except for other than mentioned below  SKIN: no visible rashes or skin lesions  Pulmonary/Chest: Effort normal  No respiratory distress       Ortho Exam  BACK EXAM:  Gait: normal, no trendelenberg gait, no antalgic gait    BACK TENDERNESS:  Spinous Processes: +L5  Paraspinal Muscles: Right paralumbar  SI Joint: no  Sacrum: no    ROM:  Flexion: 80, aggravates lumbar pain  Extension: 20, aggravates right paralumbar  Lateral flexion: 20 b/l  Rotation: 20 b/l    DERMATOMAL SENSATION:  L1: normal   L2: normal   L3: normal   L4: normal   L5: normal   S1: normal    STRENGTH (bilateral):  Knee Extension: 5/5  Knee Flexion: 5/5  Foot Dorsiflexion: 5/5  Great Toe Extension: 5/5  Foot Plantarflexion: 5/5  Hip Flexion: 5/5      REFLEXES:  Patellar: 2+ bilateral  Achilles: 2+ bilateral  Clonus: negative bilateral    BACK:   SUPINE STRAIGHT LEG: negative    HIP:  LOG ROLL: negative  YOJANA: negative  FADIR: negative        Procedures

## 2022-01-25 ENCOUNTER — EVALUATION (OUTPATIENT)
Dept: PHYSICAL THERAPY | Facility: CLINIC | Age: 32
End: 2022-01-25
Payer: COMMERCIAL

## 2022-01-25 DIAGNOSIS — M54.41 LOW BACK PAIN WITH RIGHT-SIDED SCIATICA, UNSPECIFIED BACK PAIN LATERALITY, UNSPECIFIED CHRONICITY: Primary | ICD-10-CM

## 2022-01-25 DIAGNOSIS — R10.13 EPIGASTRIC PAIN: Primary | ICD-10-CM

## 2022-01-25 PROCEDURE — 97110 THERAPEUTIC EXERCISES: CPT | Performed by: PHYSICAL THERAPIST

## 2022-01-25 PROCEDURE — 97162 PT EVAL MOD COMPLEX 30 MIN: CPT | Performed by: PHYSICAL THERAPIST

## 2022-01-25 NOTE — TELEPHONE ENCOUNTER
Additional Information   Negative: Is this related to a work injury?  Negative: Is this related to an MVA?  Negative: Are you currently recieving homecare services?     Protocols used: [facility] [employer] [employer] [employer] PROTOCOL

## 2022-01-25 NOTE — TELEPHONE ENCOUNTER
Additional Information   Negative: Is this related to a work injury?  Negative: Is this related to an MVA?  Negative: Are you currently recieving homecare services?  Negative: Has the patient had unexplained weight loss?  Negative: Does the patient have a fever?  Negative: Is the patient experiencing urine retention?  Negative: Is the patient experiencing acute drop foot or paralysis?  Negative: Has the patient experienced major trauma? (fall from height, high speed collision, direct blow to spine) and is also experiencing nausea, light-headedness, or loss of consciousness?  Negative: Is the patient experiencing blood in sputum?     Protocols used: [facility] COMPREHENSIVE SPINE PROGRAM PROTOCOL

## 2022-01-25 NOTE — TELEPHONE ENCOUNTER
Additional Information   Negative: Is this related to a work injury?  Negative: Is this related to an MVA?  Negative: Are you currently recieving homecare services?  Negative: Has the patient had unexplained weight loss?  Negative: Does the patient have a fever?  Negative: Is the patient experiencing urine retention?  Negative: Is the patient experiencing acute drop foot or paralysis?     Protocols used: [facility] COMPREHENSIVE SPINE PROGRAM PROTOCOL

## 2022-01-25 NOTE — TELEPHONE ENCOUNTER
Additional Information   Negative: Is this related to a work injury?  Negative: Is this related to an MVA?  Negative: Are you currently recieving homecare services?  Negative: Has the patient had unexplained weight loss?     Protocols used: [facility] COMPREHENSIVE SPINE PROGRAM PROTOCOL

## 2022-01-25 NOTE — PROGRESS NOTES
PT Evaluation     Today's date: 2022  Patient name: Jose Rangel  : 1990  MRN: [id number]  Referring provider: Shey Gaytan MD  Dx:   Encounter Diagnosis     ICD-10-CM    1  Low back pain with right-sided sciatica, unspecified back pain laterality, unspecified chronicity  M54 41 Ambulatory Referral to Comprehensive Spine PT                  Assessment  Assessment details: Jose Rangel is a 32 y o  female presenting to physical therapy with referral into [facility] comprehensive spine program secondary to acute onset of chronic right sided low back pain 2 months ago falling into the moderate risk category for low back pain  Patient presents with pain, decreased range of motion, gait/balance dysfunction and decreased activity tolerance  Assessment reveals  Secondary to these impairments, patient has increased difficulty performing ADL's, household chores and  work related tasks  Espinoza Baptiste would benefit from skilled PT to address these issues and maximize function    Thank you for the referral   Impairments: abnormal muscle tone, abnormal or restricted ROM, abnormal movement, activity intolerance, impaired physical strength and pain with function  Understanding of Dx/Px/POC: excellent   Prognosis: good    Goals  Short Term Goals: to be achieved by 4 weeks  1) Patient to be independent with initial HEP  2) Decrease pain to < or equal to 6/10 at its worst  3) Increase lumbar spine ROM by 25% in all deficient planes  4) Increase LE strength by 1/2 MMT grade in all deficient planes  5) Patient to report decreased sleep interruption secondary to pain  6) Patient will demonstrate ability to lift objects < 10 pounds from the floor without pain and proper mechanics    Long Term Goals: to be achieved by discharge  1) Increase FOTO score > or equal to expected outcome  2) Patient to be independent with comprehensive HEP  3) Abolish pain for improved quality of life  4) Lumbar spine AROM WNL all planes to improve a/iadls  5) Increase LE strength to 5/5 MMT grade in all planes to improve a/iadls  6) Patient to report no sleep interruption secondary to pain  7) Patient will demonstrate ability to lift objects > 10 pounds from the floor without pain and proper mechanics      Plan  Plan details: Low Risk = 1-2 visits  Moderate Risk = 6-8 visits  High Risk = 12-18 visits  Patient would benefit from: skilled PT  Planned modality interventions: TENS and thermotherapy: hydrocollator packs  Planned therapy interventions: joint mobilization, manual therapy, neuromuscular re-education, patient education, strengthening, stretching, therapeutic exercise, home exercise program, ADL training and abdominal trunk stabilization  Frequency: 2x week  Duration in weeks: 8  Treatment plan discussed with: patient        Subjective Evaluation    History of Present Illness  Mechanism of injury: Patient presents to outpatient PT secondary to worsening of chronic low back pain  Patient states that 2 months prior she fell backwards onto her right buttock with significant immediate pain  Patient notes that it improved mildly over time but 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  Patient states that her children at work are from 6 months to 10years of age  Patient notes that her symptoms are only worsened by bending and right side lying which increases symptoms distal to the knee  Patient also notes a GI appointment with negative findings during EGD  Pain is resolved and decreased with standing in certain positions  Patients goals for PT are to decrease the pain and return to PLOF      Pain  Current pain ratin  At best pain rating: 3  At worst pain rating: 10  Quality: radiating, dull ache and sharp  Aggravating factors: lifting (bending)  Progression: worsening    Social Support  Steps to enter house: yes  Stairs in house: yes   Lives in: multiple-level home  Lives with: spouse and young children    Employment status: working    Diagnostic Tests  X-ray: abnormal (L5/S1 spondylosis)  Treatments  Previous treatment: medication and injection treatment  Current treatment: medication  Patient Goals  Patient goals for therapy: increased strength, independence with ADLs/IADLs, return to sport/leisure activities, decreased pain and increased motion          Objective     Concurrent Complaints  Positive for disturbed sleep  Negative for night pain, bladder dysfunction, bowel dysfunction and saddle (S4) numbness    Neurological Testing     Sensation     Lumbar   Left   Intact: light touch    Right   Intact: light touch    Reflexes   Left   Patellar (L4): trace (1+)  Achilles (S1): trace (1+)  Babinski sign: negative  Clonus sign: negative    Right   Patellar (L4): trace (1+)  Achilles (S1): trace (1+)  Babinski sign: negative  Clonus sign: negative    Active Range of Motion     Lumbar   Left lateral flexion:  WFL and with pain  Right lateral flexion:  WFL  Left rotation:  WFL and with pain  Right rotation:  WFL and with pain    Joint Play     Hypomobile: L1, L2, L3, L4, L5 and S1     Pain: L5 and S1   Mechanical Assessment    Cervical      Thoracic      Lumbar    Standing flexion: repeated movements   Pain location:peripheralized  Pain intensity: worse  Standing extension: repeated movements  Pain location: centralized  Pain intensity: better    Strength/Myotome Testing     Left Hip   Planes of Motion   Flexion: 4+  External rotation: 4  Internal rotation: 4    Right Hip   Planes of Motion   Flexion: 4+  External rotation: 4  Internal rotation: 4    Left Knee   Flexion: 5  Extension: 5    Right Knee   Flexion: 5  Extension: 5    Left Ankle/Foot   Dorsiflexion: 4+  Plantar flexion: 4+    Right Ankle/Foot   Dorsiflexion: 4+  Plantar flexion: 4+    Muscle Activation   Patient able to activate left transverse abdominals, left multifidus, right transverse abdominals and right multifidus  Additional Muscle Activation Details  MMT:  TA = 3+/5  Multifidus = 3+/5     Tests     Lumbar     Right   Positive passive SLR and slump test      Right Hip   Positive YOJANA, FADIR and long sit       Additional Tests Details  (+) Right PI             Precautions: Chronic LBP, Anxiety      Manuals 1/25            R PI correction via MDR             R PI MET             Lumbar extension with overpressure                          Neuro Re-Ed                                                                                                        Ther Ex             Ellipitcal             Slump nerve glides             R PI MET w/ dowel             TA activation             FRANCK             Repeated extension in standing             TA marches             Qped UE flexion             Ther Activity                                       Gait Training                                       Modalities

## 2022-01-25 NOTE — TELEPHONE ENCOUNTER
Additional Information   Negative: Is this related to a work injury?     Protocols used: [facility] COMPREHENSIVE SPINE PROGRAM PROTOCOL

## 2022-01-25 NOTE — TELEPHONE ENCOUNTER
Additional Information   Negative: Is this related to a work injury?  Negative: Is this related to an MVA?  Negative: Are you currently recieving homecare services?  Negative: Has the patient had unexplained weight loss?  Negative: Does the patient have a fever?  Negative: Is the patient experiencing urine retention?     Protocols used: [facility] COMPREHENSIVE SPINE PROGRAM PROTOCOL

## 2022-01-25 NOTE — RESULT ENCOUNTER NOTE
My medical assistant will call patient with results  Small-bowel biopsy show mild nonspecific inflammation  I have ordered blood work to make sure that from a autoimmune disorder  Blood work should be obtained prior to a follow-up visit in April    The stomach biopsy showed mild inflammation and negative for infection or otherwise normal

## 2022-01-25 NOTE — TELEPHONE ENCOUNTER
Additional Information   Negative: Is this related to a work injury?  Negative: Is this related to an MVA?  Negative: Are you currently recieving homecare services?  Negative: Has the patient had unexplained weight loss?  Negative: Does the patient have a fever?  Negative: Is the patient experiencing urine retention?  Negative: Is the patient experiencing acute drop foot or paralysis?  Negative: Has the patient experienced major trauma? (fall from height, high speed collision, direct blow to spine) and is also experiencing nausea, light-headedness, or loss of consciousness?  Negative: Is the patient experiencing blood in sputum?  Negative: Is this a chronic condition?     Protocols used: [facility] COMPREHENSIVE SPINE PROGRAM PROTOCOL

## 2022-01-28 ENCOUNTER — TELEPHONE (OUTPATIENT)
Dept: GASTROENTEROLOGY | Facility: CLINIC | Age: 32
End: 2022-01-28

## 2022-01-28 ENCOUNTER — OFFICE VISIT (OUTPATIENT)
Dept: PHYSICAL THERAPY | Facility: CLINIC | Age: 32
End: 2022-01-28
Payer: COMMERCIAL

## 2022-01-28 DIAGNOSIS — M54.41 LOW BACK PAIN WITH RIGHT-SIDED SCIATICA, UNSPECIFIED BACK PAIN LATERALITY, UNSPECIFIED CHRONICITY: Primary | ICD-10-CM

## 2022-01-28 PROCEDURE — 97140 MANUAL THERAPY 1/> REGIONS: CPT | Performed by: PHYSICAL THERAPIST

## 2022-01-28 PROCEDURE — 97110 THERAPEUTIC EXERCISES: CPT | Performed by: PHYSICAL THERAPIST

## 2022-01-28 PROCEDURE — 97112 NEUROMUSCULAR REEDUCATION: CPT | Performed by: PHYSICAL THERAPIST

## 2022-01-28 NOTE — TELEPHONE ENCOUNTER
Patients GI provider:  Dr Behzad Whitaker    Number to return call: [phone]    Reason for call: Pt calling to speak to someone about her EGD results       Scheduled procedure/appointment date if applicable: Appt 9/71/24

## 2022-01-28 NOTE — PROGRESS NOTES
Daily Note     Today's date: 2022  Patient name: Christy Santa  : 1990  MRN: [id number]  Referring provider: Trudy Resendez MD  Dx:   Encounter Diagnosis     ICD-10-CM    1  Low back pain with right-sided sciatica, unspecified back pain laterality, unspecified chronicity  M54 41                   Subjective: Patient reports some decrease in pain/symptoms since prior session and states that she has been compliant with her HEP  Patient notes that she was sore for 2 days following manuals  Objective: See treatment diary below      Assessment: Patient demonstrates stable SI alignment via supine to sit testing  Began core progressions as per below and patient tolerated well  Updated patients HEP accordingly  Plan: Continue per plan of care        Precautions: Chronic LBP, Anxiety      Manuals            R PI correction via MDR  G5           R PI MET  5x5"           Lumbar extension with overpressure  10 x5"                        Neuro Re-Ed             Cues throughout session  10'                                                                                         Ther Ex             Ellipitcal  L1  5'           Slump nerve glides  2x10           R PI MET w/ dowel  10 x5"           TA activation  10 x10"           FRANCK  10 x10"           Repeated extension in standing  10 x10"           TA marches  2x10 B/L           Qped UE flexion  2x10 B/L           Ther Activity                                       Gait Training                                       Modalities

## 2022-02-03 ENCOUNTER — OFFICE VISIT (OUTPATIENT)
Dept: PHYSICAL THERAPY | Facility: CLINIC | Age: 32
End: 2022-02-03
Payer: COMMERCIAL

## 2022-02-03 DIAGNOSIS — M54.41 LOW BACK PAIN WITH RIGHT-SIDED SCIATICA, UNSPECIFIED BACK PAIN LATERALITY, UNSPECIFIED CHRONICITY: Primary | ICD-10-CM

## 2022-02-03 PROCEDURE — 97112 NEUROMUSCULAR REEDUCATION: CPT | Performed by: PHYSICAL THERAPIST

## 2022-02-03 PROCEDURE — 97140 MANUAL THERAPY 1/> REGIONS: CPT | Performed by: PHYSICAL THERAPIST

## 2022-02-03 PROCEDURE — 97110 THERAPEUTIC EXERCISES: CPT | Performed by: PHYSICAL THERAPIST

## 2022-02-03 NOTE — PROGRESS NOTES
Daily Note     Today's date: 2/3/2022  Patient name: Aminah Phelps  : 1990  MRN: [id number]  Referring provider: Seda Ruth MD  Dx:   Encounter Diagnosis     ICD-10-CM    1  Low back pain with right-sided sciatica, unspecified back pain laterality, unspecified chronicity  M54 41                   Subjective: Patient states that her pain has been significant less and that she remains compliant with her HEP  Pain/discomfort remains with lumbar flexion  Objective: See treatment diary below      Assessment: Patient demonstrating significant improvement in TA activation  Cues required to prevent anterior pelvic tilting throughout stability progressions  Patient challenged by lifting mechanics as she demonstrated excessive anterior tibial excursion and cues to improve object proximity  Plan: Continue per plan of care        Precautions: Chronic LBP, Anxiety      Manuals 1/25 1/28 2/3          R PI correction via MDR  G5 G5          R PI MET  5x5" 5x5"          Lumbar extension with overpressure  10 x5" 10 x5"                       Neuro Re-Ed             Cues throughout session  10' 10'                                                                                        Ther Ex             Ellipitcal  L1  5' L1  5'          Slump nerve glides  2x10 2x10          R PI MET w/ dowel  10 x5" 10 x5"          TA activation  10 x10" 10 x10"          FRANCK  10 x10" 10 x10"          Repeated extension in standing  10 x10" 10 x10"          TA marches  2x10 B/L 2x10 B/L          Qped UE flexion  2x10 B/L           Dead lifts   15#  2x10          Qped bird dogs   2x10                       Ther Activity                                       Gait Training                                       Modalities

## 2022-02-04 ENCOUNTER — LAB (OUTPATIENT)
Dept: LAB | Facility: MEDICAL CENTER | Age: 32
End: 2022-02-04
Payer: COMMERCIAL

## 2022-02-04 DIAGNOSIS — R10.13 EPIGASTRIC PAIN: ICD-10-CM

## 2022-02-04 LAB — IGA SERPL-MCNC: 255 MG/DL (ref 70–400)

## 2022-02-04 PROCEDURE — 86364 TISS TRNSGLTMNASE EA IG CLAS: CPT

## 2022-02-04 PROCEDURE — 82784 ASSAY IGA/IGD/IGG/IGM EACH: CPT

## 2022-02-04 PROCEDURE — 36415 COLL VENOUS BLD VENIPUNCTURE: CPT

## 2022-02-05 LAB — TTG IGA SER-ACNC: <2 U/ML (ref 0–3)

## 2022-02-07 ENCOUNTER — OFFICE VISIT (OUTPATIENT)
Dept: PHYSICAL THERAPY | Facility: CLINIC | Age: 32
End: 2022-02-07
Payer: COMMERCIAL

## 2022-02-07 DIAGNOSIS — M54.41 LOW BACK PAIN WITH RIGHT-SIDED SCIATICA, UNSPECIFIED BACK PAIN LATERALITY, UNSPECIFIED CHRONICITY: Primary | ICD-10-CM

## 2022-02-07 PROCEDURE — 97140 MANUAL THERAPY 1/> REGIONS: CPT | Performed by: PHYSICAL THERAPIST

## 2022-02-07 PROCEDURE — 97110 THERAPEUTIC EXERCISES: CPT | Performed by: PHYSICAL THERAPIST

## 2022-02-07 PROCEDURE — 97112 NEUROMUSCULAR REEDUCATION: CPT | Performed by: PHYSICAL THERAPIST

## 2022-02-07 NOTE — PROGRESS NOTES
Daily Note     Today's date: 2022  Patient name: Bernard Bruno  : 1990  MRN: [id number]  Referring provider: Cleve Winn MD  Dx:   Encounter Diagnosis     ICD-10-CM    1  Low back pain with right-sided sciatica, unspecified back pain laterality, unspecified chronicity  M54 41                   Subjective: Patient reports no pain this weekend and that she remains compliant with her HEP  Objective: See treatment diary below      Assessment: Patient demonstrating no SIJ dysfunction with appropriate gait mechanics  Progressed core stability exercises as per below and patient tolerated well - requiring only cues to prevent compensations due to lack of neuromuscular awareness  Lifting mechanics and lumbopelvic rhythm significantly improved  Plan: Continue per plan of care        Precautions: Chronic LBP, Anxiety      Manuals 1/25 1/28 2/3 2/7         R PI correction via MDR  G5 G5 G5         R PI MET  5x5" 5x5" 5x5"         Lumbar extension with overpressure  10 x5" 10 x5" 10 x5"                      Neuro Re-Ed             Cues throughout session  10' 10' 10'                                                                                       Ther Ex             Ellipitcal  L1  5' L1  5' L1  5'         Slump nerve glides  2x10 2x10 2x10         R PI MET w/ dowel  10 x5" 10 x5" 10 x5"         TA activation  10 x10" 10 x10" 10 x10"         FRANCK  10 x10" 10 x10" 10 x10"         Repeated extension in standing  10 x10" 10 x10" 10 x10"         TA marches  2x10 B/L 2x10 B/L 2x10 B/L         Qped UE flexion  2x10 B/L           Dead lifts   15#  2x10 15#  2x10         Qped bird dogs   2x10 2x10         Paloff press    GTB  2x10 B/L         Ther Activity                                       Gait Training                                       Modalities

## 2022-02-10 ENCOUNTER — OFFICE VISIT (OUTPATIENT)
Dept: PHYSICAL THERAPY | Facility: CLINIC | Age: 32
End: 2022-02-10
Payer: COMMERCIAL

## 2022-02-10 DIAGNOSIS — M54.41 LOW BACK PAIN WITH RIGHT-SIDED SCIATICA, UNSPECIFIED BACK PAIN LATERALITY, UNSPECIFIED CHRONICITY: Primary | ICD-10-CM

## 2022-02-10 PROCEDURE — 97140 MANUAL THERAPY 1/> REGIONS: CPT | Performed by: PHYSICAL THERAPIST

## 2022-02-10 PROCEDURE — 97110 THERAPEUTIC EXERCISES: CPT | Performed by: PHYSICAL THERAPIST

## 2022-02-10 PROCEDURE — 97112 NEUROMUSCULAR REEDUCATION: CPT | Performed by: PHYSICAL THERAPIST

## 2022-02-10 NOTE — PROGRESS NOTES
Daily Note     Today's date: 2/10/2022  Patient name: Jose Rangel  : 1990  MRN: [id number]  Referring provider: Shey Gaytan MD  Dx:   Encounter Diagnosis     ICD-10-CM    1  Low back pain with right-sided sciatica, unspecified back pain laterality, unspecified chronicity  M54 41                   Subjective: Patient reports that her symptoms continue to remain minimal with no discomfort and is compliant with her current HEP  Objective: See treatment diary below      Assessment: Patient demonstrates continued improvement in core activation/stability without loss of contraction during movement  Appropriate lumbopelvic rhythm persists with dead lifts  Progressed core stability to modified wall plank due to improved neuromuscular awareness and patient tolerated well  Updated HEP accordingly  Plan: Continue per plan of care        Precautions: Chronic LBP, Anxiety      Manuals 1/25 1/28 2/3 2/7 2/10        R PI correction via MDR  G5 G5 G5 G5        R PI MET  5x5" 5x5" 5x5" 5x5"        Lumbar extension with overpressure  10 x5" 10 x5" 10 x5" 10 x5"                     Neuro Re-Ed             Cues throughout session  10' 10' 10' 10'                                                                                      Ther Ex             Ellipitcal  L1  5' L1  5' L1  5' L1  5'        Slump nerve glides  2x10 2x10 2x10 2x10        R PI MET w/ dowel  10 x5" 10 x5" 10 x5" 10 x5"        TA activation  10 x10" 10 x10" 10 x10" 10 x10"        FRANCK  10 x10" 10 x10" 10 x10" 10 x10"        Repeated extension in standing  10 x10" 10 x10" 10 x10" 10 x10"        TA marches  2x10 B/L 2x10 B/L 2x10 B/L 2x10 B/L        Qped UE flexion  2x10 B/L           Dead lifts   15#  2x10 15#  2x10 15#  2x10        Qped bird dogs   2x10 2x10 2x10        Paloff press    GTB  2x10 B/L GTB  2x10 B/L        Modified wall plank     2x10 B/L                                  Ther Activity                                       Gait Training                                       Modalities

## 2022-02-14 ENCOUNTER — APPOINTMENT (OUTPATIENT)
Dept: PHYSICAL THERAPY | Facility: CLINIC | Age: 32
End: 2022-02-14
Payer: COMMERCIAL

## 2022-02-14 NOTE — PROGRESS NOTES
Daily Note     Today's date: 2022  Patient name: Maryanne Muniz  : 1990  MRN: [id number]  Referring provider: Elin Ahumada, MD  Dx: No diagnosis found  Subjective: ***      Objective: See treatment diary below      Assessment: Tolerated treatment {Tolerated treatment :}   Patient {assessment:[id number]}      Plan: {PLAN:}     Precautions: Chronic LBP, Anxiety      Manuals 1/25 1/28 2/3 2/7 2/10        R PI correction via MDR  G5 G5 G5 G5        R PI MET  5x5" 5x5" 5x5" 5x5"        Lumbar extension with overpressure  10 x5" 10 x5" 10 x5" 10 x5"                     Neuro Re-Ed             Cues throughout session  10' 10' 10' 10'                                                                                      Ther Ex             Ellipitcal  L1  5' L1  5' L1  5' L1  5'        Slump nerve glides  2x10 2x10 2x10 2x10        R PI MET w/ dowel  10 x5" 10 x5" 10 x5" 10 x5"        TA activation  10 x10" 10 x10" 10 x10" 10 x10"        FRANCK  10 x10" 10 x10" 10 x10" 10 x10"        Repeated extension in standing  10 x10" 10 x10" 10 x10" 10 x10"        TA marches  2x10 B/L 2x10 B/L 2x10 B/L 2x10 B/L        Qped UE flexion  2x10 B/L           Dead lifts   15#  2x10 15#  2x10 15#  2x10        Qped bird dogs   2x10 2x10 2x10        Paloff press    GTB  2x10 B/L GTB  2x10 B/L        Modified wall plank     2x10 B/L                                  Ther Activity                                       Gait Training                                       Modalities

## 2022-02-17 ENCOUNTER — APPOINTMENT (OUTPATIENT)
Dept: PHYSICAL THERAPY | Facility: CLINIC | Age: 32
End: 2022-02-17
Payer: COMMERCIAL

## 2022-02-21 ENCOUNTER — APPOINTMENT (OUTPATIENT)
Dept: PHYSICAL THERAPY | Facility: CLINIC | Age: 32
End: 2022-02-21
Payer: COMMERCIAL

## 2022-02-23 ENCOUNTER — OFFICE VISIT (OUTPATIENT)
Dept: PHYSICAL THERAPY | Facility: CLINIC | Age: 32
End: 2022-02-23
Payer: COMMERCIAL

## 2022-02-23 DIAGNOSIS — M54.41 LOW BACK PAIN WITH RIGHT-SIDED SCIATICA, UNSPECIFIED BACK PAIN LATERALITY, UNSPECIFIED CHRONICITY: Primary | ICD-10-CM

## 2022-02-23 PROCEDURE — 97140 MANUAL THERAPY 1/> REGIONS: CPT | Performed by: PHYSICAL THERAPIST

## 2022-02-23 PROCEDURE — 97110 THERAPEUTIC EXERCISES: CPT | Performed by: PHYSICAL THERAPIST

## 2022-02-23 PROCEDURE — 97112 NEUROMUSCULAR REEDUCATION: CPT | Performed by: PHYSICAL THERAPIST

## 2022-02-23 NOTE — PROGRESS NOTES
Daily Note     Today's date: 2022  Patient name: Karol Yeung  : 1990  MRN: [id number]  Referring provider: Rob Rosales MD  Dx:   Encounter Diagnosis     ICD-10-CM    1  Low back pain with right-sided sciatica, unspecified back pain laterality, unspecified chronicity  M54 41                   Subjective: Patient reports continued HEP compliance and reports no pain or discomfort  Objective: See treatment diary below      Assessment: Patient demonstrates no innominate dysfunction per testing  Continued cues required during early TA march to prevent compensations of abdominal bracing  Updated HEP to include TA press out  Plan: Continue per plan of care        Precautions: Chronic LBP, Anxiety      Manuals 1/25 1/28 2/3 2/7 2/10 2/23       R PI correction via MDR  G5 G5 G5 G5        R PI MET  5x5" 5x5" 5x5" 5x5" 5x5"       Lumbar extension with overpressure  10 x5" 10 x5" 10 x5" 10 x5" 10 x5"                    Neuro Re-Ed             Cues throughout session  10' 10' 10' 10' 10'                                                                                     Ther Ex             Ellipitcal  L1  5' L1  5' L1  5' L1  5' L1  5'       Slump nerve glides  2x10 2x10 2x10 2x10 2x10       R PI MET w/ dowel  10 x5" 10 x5" 10 x5" 10 x5" 10 x5'       TA activation  10 x10" 10 x10" 10 x10" 10 x10" qped 10 x10"       FRANCK  10 x10" 10 x10" 10 x10" 10 x10" 10 x10"       Repeated extension in standing  10 x10" 10 x10" 10 x10" 10 x10" 10 x10"       TA marches L2  2x10 B/L 2x10 B/L 2x10 B/L 2x10 B/L 2x10 B/L       Qped UE flexion  2x10 B/L           Dead lifts   15#  2x10 15#  2x10 15#  2x10 25#  2x10       Qped bird dogs   2x10 2x10 2x10 2x10       Paloff press    GTB  2x10 B/L GTB  2x10 B/L GTB  2x10 B/L       Modified wall plank     2x10 B/L 2x10 B/L                                 Ther Activity                                       Gait Training                                       Modalities

## 2022-02-28 ENCOUNTER — OFFICE VISIT (OUTPATIENT)
Dept: PHYSICAL THERAPY | Facility: CLINIC | Age: 32
End: 2022-02-28
Payer: COMMERCIAL

## 2022-02-28 DIAGNOSIS — M54.41 LOW BACK PAIN WITH RIGHT-SIDED SCIATICA, UNSPECIFIED BACK PAIN LATERALITY, UNSPECIFIED CHRONICITY: Primary | ICD-10-CM

## 2022-02-28 PROCEDURE — 97112 NEUROMUSCULAR REEDUCATION: CPT | Performed by: PHYSICAL THERAPIST

## 2022-02-28 PROCEDURE — 97140 MANUAL THERAPY 1/> REGIONS: CPT | Performed by: PHYSICAL THERAPIST

## 2022-02-28 PROCEDURE — 97110 THERAPEUTIC EXERCISES: CPT | Performed by: PHYSICAL THERAPIST

## 2022-02-28 NOTE — PROGRESS NOTES
Daily Note     Today's date: 2022  Patient name: Jose Rangel  : 1990  MRN: [id number]  Referring provider: Shey Gaytan MD  Dx:   Encounter Diagnosis     ICD-10-CM    1  Low back pain with right-sided sciatica, unspecified back pain laterality, unspecified chronicity  M54 41                   Subjective: Patient reports continued HEP compliance and states that her symptoms increased from dancing while celebrating her birthday weekend  Objective: See treatment diary below      Assessment: Patients SIJ testing negative despite increased soreness as this may be muscular based  Patient demonstrated proper form and mechanics with all TE and progressed bird dogs with resistance and and multifidus press with TB to updated her HEP  Overall patient is progressing very well  Plan: Continue per plan of care        Precautions: Chronic LBP, Anxiety      Manuals 1/25 1/28 2/3 2/7 2/10 2/23 2/28      R PI correction via MDR  G5 G5 G5 G5        R PI MET  5x5" 5x5" 5x5" 5x5" 5x5" 5x5"      Lumbar extension with overpressure  10 x5" 10 x5" 10 x5" 10 x5" 10 x5" 10 x5"                   Neuro Re-Ed             Cues throughout session  10' 10' 10' 10' 10' 10'                                                                                    Ther Ex             Ellipitcal  L1  5' L1  5' L1  5' L1  5' L1  5' L1  5'      Slump nerve glides  2x10 2x10 2x10 2x10 2x10 2x10      R PI MET w/ dowel  10 x5" 10 x5" 10 x5" 10 x5" 10 x5' 10 x5'      TA activation  10 x10" 10 x10" 10 x10" 10 x10" qped 10 x10" qped 10 x10"      FRANCK  10 x10" 10 x10" 10 x10" 10 x10" 10 x10" 10 x10"      Repeated extension in standing  10 x10" 10 x10" 10 x10" 10 x10" 10 x10" 10 x10"      TA marches L2  2x10 B/L 2x10 B/L 2x10 B/L 2x10 B/L 2x10 B/L D/C      Qped UE flexion  2x10 B/L           Dead lifts   15#  2x10 15#  2x10 15#  2x10 25#  2x10 25#  2x10      Qped bird dogs   2x10 2x10 2x10 2x10 2x10 GTB      Paloff press    GTB  2x10 B/L GTB  2x10 B/L GTB  2x10 B/L BTB  2x10 B/L      Modified wall plank     2x10 B/L 2x10 B/L 2x10 B/L                                Ther Activity                                       Gait Training                                       Modalities

## 2022-03-02 ENCOUNTER — APPOINTMENT (OUTPATIENT)
Dept: PHYSICAL THERAPY | Facility: CLINIC | Age: 32
End: 2022-03-02
Payer: COMMERCIAL

## 2022-03-07 ENCOUNTER — OFFICE VISIT (OUTPATIENT)
Dept: PHYSICAL THERAPY | Facility: CLINIC | Age: 32
End: 2022-03-07
Payer: COMMERCIAL

## 2022-03-07 DIAGNOSIS — M54.41 LOW BACK PAIN WITH RIGHT-SIDED SCIATICA, UNSPECIFIED BACK PAIN LATERALITY, UNSPECIFIED CHRONICITY: Primary | ICD-10-CM

## 2022-03-07 PROCEDURE — 97110 THERAPEUTIC EXERCISES: CPT | Performed by: PHYSICAL THERAPIST

## 2022-03-07 PROCEDURE — 97140 MANUAL THERAPY 1/> REGIONS: CPT | Performed by: PHYSICAL THERAPIST

## 2022-03-07 NOTE — PROGRESS NOTES
PT Re-Evaluation  and PT Discharge    Today's date: 3/7/2022  Patient name: Laxmi Marquez  : 1990  MRN: [id number]  Referring provider: Ita Curry MD  Dx:   Encounter Diagnosis     ICD-10-CM    1  Low back pain with right-sided sciatica, unspecified back pain laterality, unspecified chronicity  M54 [id number]                   Assessment  Assessment details: Patient is a 28y o  year old female who attended physical therapy for 8 treatment sessions regarding LBP  Patient reports 75% improvement at this time which correlates with FOTO scoring  Patient has shown improvement throughout PT by demonstrating decreased pain, increased range of motion, increased strength, improved tolerance to activity and improved gait/balance  Secondary to achieving functional goals and independence with comprehensive Home Exercise Program, Jessica Trejo will be discharged from PT at this time  Thank you     Impairments: abnormal muscle tone, abnormal or restricted ROM, abnormal movement, activity intolerance, impaired physical strength and pain with function  Understanding of Dx/Px/POC: excellent   Prognosis: good    Goals  Short Term Goals: to be achieved by 4 weeks  1) Patient to be independent with initial HEP (MET)  2) Decrease pain to < or equal to 6/10 at its worst (MET)  3) Increase lumbar spine ROM by 25% in all deficient planes (MET)  4) Increase LE strength by 1/2 MMT grade in all deficient planes (MET)  5) Patient to report decreased sleep interruption secondary to pain (MET)  6) Patient will demonstrate ability to lift objects < 10 pounds from the floor without pain and proper mechanics (MET)    Long Term Goals: to be achieved by discharge  1) Increase FOTO score > or equal to expected outcome (MET)  2) Patient to be independent with comprehensive HEP (MET)  3) Abolish pain for improved quality of life (MET)  4) Lumbar spine AROM WNL all planes to improve a/iadls (MET)  5) Increase LE strength to 5/5 MMT grade in all planes to improve a/iadls (MET)  6) Patient to report no sleep interruption secondary to pain (MET)  7) Patient will demonstrate ability to lift objects > 10 pounds from the floor without pain and proper mechanics (MET)      Plan  Plan details: Low Risk = 1-2 visits  Moderate Risk = 6-8 visits  High Risk = 12-18 visits  Patient would benefit from: skilled PT  Planned modality interventions: TENS and thermotherapy: hydrocollator packs  Planned therapy interventions: joint mobilization, manual therapy, neuromuscular re-education, patient education, strengthening, stretching, therapeutic exercise, home exercise program, ADL training and abdominal trunk stabilization  Frequency: 2x week  Duration in weeks: 8  Treatment plan discussed with: patient        Subjective Evaluation    History of Present Illness  Mechanism of injury: Patient presents to outpatient PT secondary to worsening of chronic low back pain  Patient states that 2 months prior she fell backwards onto her right buttock with significant immediate pain  Patient notes that it improved mildly over time but 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  Patient states that her children at work are from 6 months to 10years of age  Patient notes that her symptoms are only worsened by bending and right side lying which increases symptoms distal to the knee  Patient also notes a GI appointment with negative findings during EGD  Pain is resolved and decreased with standing in certain positions  Patients goals for PT are to decrease the pain and return to PLOF       3/7/2022: Patient reports significant improvement overall since IE  No pain noted through most occasions but present with forward flexion/lifting based activities    Patient has been compliant with her HEP and is confident in continued progession  Pain  Current pain ratin  At best pain ratin  At worst pain ratin  Quality: radiating, dull ache and sharp  Aggravating factors: lifting (bending)  Progression: worsening    Social Support  Steps to enter house: yes  Stairs in house: yes   Lives in: multiple-level home  Lives with: spouse and young children    Employment status: working    Diagnostic Tests  X-ray: abnormal (L5/S1 spondylosis)  Treatments  Previous treatment: medication and injection treatment  Current treatment: medication  Patient Goals  Patient goals for therapy: increased strength, independence with ADLs/IADLs, return to sport/leisure activities, decreased pain and increased motion          Objective     Concurrent Complaints  Positive for disturbed sleep   Negative for night pain, bladder dysfunction, bowel dysfunction and saddle (S4) numbness    Neurological Testing     Sensation     Lumbar   Left   Intact: light touch    Right   Intact: light touch    Reflexes   Left   Patellar (L4): trace (1+)  Achilles (S1): trace (1+)  Babinski sign: negative  Clonus sign: negative    Right   Patellar (L4): trace (1+)  Achilles (S1): trace (1+)  Babinski sign: negative  Clonus sign: negative    Active Range of Motion     Lumbar   Flexion:  WFL and with pain  Extension:  WFL  Left lateral flexion:  WFL and with pain  Right lateral flexion:  WFL  Left rotation:  WFL and with pain  Right rotation:  WFL and with pain    Joint Play   Joints within functional limits: L1, L2, L3 and L4     Hypomobile: L5 and S1   Mechanical Assessment    Cervical      Thoracic      Lumbar    Standing flexion: repeated movements   Pain location:peripheralized  Pain intensity: worse  Standing extension: repeated movements  Pain location: centralized  Pain intensity: better    Strength/Myotome Testing     Left Hip   Planes of Motion   Flexion: 4+  External rotation: 4+  Internal rotation: 4+    Right Hip   Planes of Motion   Flexion: 4+  External rotation: 4+  Internal rotation: 4+    Left Knee   Flexion: 5  Extension: 5    Right Knee   Flexion: 5  Extension: 5    Left Ankle/Foot   Dorsiflexion: 4+  Plantar flexion: 4+    Right Ankle/Foot   Dorsiflexion: 4+  Plantar flexion: 4+    Muscle Activation   Patient able to activate left transverse abdominals, left multifidus, right transverse abdominals and right multifidus  Additional Muscle Activation Details  MMT:  TA = 3+/5 (3/7/2022: 4/5)  Multifidus = 3+/5 (3/7/2022: 4+/5 L, 4-/5 R)    Tests     Lumbar     Right   Positive passive SLR and slump test      Right Hip   Negative YOJANA, FADIR and long sit       Additional Tests Details  (+) Right PI (RSOLVED)             Precautions: Chronic LBP, Anxiety      Manuals 1/25 1/28 2/3 2/7 2/10 2/23 2/28 3/7     R PI correction via MDR  G5 G5 G5 G5        R PI MET  5x5" 5x5" 5x5" 5x5" 5x5" 5x5" 5x5"     Lumbar extension with overpressure  10 x5" 10 x5" 10 x5" 10 x5" 10 x5" 10 x5" 10 x5"     Re-assessment        10'     Neuro Re-Ed             Cues throughout session  10' 10' 10' 10' 10' 10'                                                                                    Ther Ex             Ellipitcal  L1  5' L1  5' L1  5' L1  5' L1  5' L1  5' L1  5'     Slump nerve glides  2x10 2x10 2x10 2x10 2x10 2x10 2x10     R PI MET w/ dowel  10 x5" 10 x5" 10 x5" 10 x5" 10 x5' 10 x5' 10 x5"     TA activation  10 x10" 10 x10" 10 x10" 10 x10" qped 10 x10" qped 10 x10"      FRANCK  10 x10" 10 x10" 10 x10" 10 x10" 10 x10" 10 x10" 10 x10"     Repeated extension in standing  10 x10" 10 x10" 10 x10" 10 x10" 10 x10" 10 x10" 10 x10"     TA marches L2  2x10 B/L 2x10 B/L 2x10 B/L 2x10 B/L 2x10 B/L D/C      Qped UE flexion  2x10 B/L           Dead lifts   15#  2x10 15#  2x10 15#  2x10 25#  2x10 25#  2x10 25#  2x10     Qped bird dogs   2x10 2x10 2x10 2x10 2x10 GTB 2x10 GTB     Paloff press    GTB  2x10 B/L GTB  2x10 B/L GTB  2x10 B/L BTB  2x10 B/L      Modified wall plank     2x10 B/L 2x10 B/L 2x10 B/L 2x10 B/L                               Ther Activity                                       Gait Training Modalities

## 2022-03-16 ENCOUNTER — OFFICE VISIT (OUTPATIENT)
Dept: OBGYN CLINIC | Facility: MEDICAL CENTER | Age: 32
End: 2022-03-16
Payer: COMMERCIAL

## 2022-03-16 VITALS
SYSTOLIC BLOOD PRESSURE: 97 MMHG | HEIGHT: 66 IN | DIASTOLIC BLOOD PRESSURE: 62 MMHG | WEIGHT: 181 LBS | BODY MASS INDEX: 29.09 KG/M2 | HEART RATE: 82 BPM

## 2022-03-16 DIAGNOSIS — G89.29 CHRONIC RIGHT-SIDED LOW BACK PAIN WITHOUT SCIATICA: Primary | ICD-10-CM

## 2022-03-16 DIAGNOSIS — M54.50 CHRONIC RIGHT-SIDED LOW BACK PAIN WITHOUT SCIATICA: Primary | ICD-10-CM

## 2022-03-16 PROCEDURE — 3008F BODY MASS INDEX DOCD: CPT | Performed by: STUDENT IN AN ORGANIZED HEALTH CARE EDUCATION/TRAINING PROGRAM

## 2022-03-16 PROCEDURE — 1036F TOBACCO NON-USER: CPT | Performed by: STUDENT IN AN ORGANIZED HEALTH CARE EDUCATION/TRAINING PROGRAM

## 2022-03-16 PROCEDURE — 99212 OFFICE O/P EST SF 10 MIN: CPT | Performed by: STUDENT IN AN ORGANIZED HEALTH CARE EDUCATION/TRAINING PROGRAM

## 2022-03-17 NOTE — PROGRESS NOTES
1  Chronic right-sided low back pain without sciatica       No orders of the defined types were placed in this encounter  Imaging Studies (I personally reviewed images on PACS and report):     X-ray lumbar spine 01/14/2022:  No acute osseous abnormalities or destructive osseous lesions  Moderate degenerative/spondylotic changes of L5-S1    IMPRESSION:   Acute midline/right sided lumbar pain without a precipitating injury-no red flags on exam today and neurologically intact  No bowel/bladder incontinence   Lumbar spondylosis of L5-S1   Progressively improving since starting formal physical therapy      PLAN:     Clinical exam and radiographic imaging reviewed with patient today, with impression as per above  I have discussed with the patient the pathophysiology of this diagnosis and reviewed how the examination correlates with this diagnosis   I discussed with patient that as her pain still can awaken her at night, I offered an MRI of her lumbar spine without contrast for further evaluation but also counseled that she can continue to wait and see since these episodes of pain or progressively happening less frequently  Patient agreeable to defer MRI at this time and continue the home exercise program from formal PT  I counseled that this is reasonable but to follow up or call if she feels that this type of pain does not resolve over time   In regards to pain control recommended p r n  Use of acetaminophen/NSAIDs, heat/ice therapy 20 minutes on/off  Return if symptoms worsen or fail to improve  CHIEF COMPLAINT:  Chief Complaint   Patient presents with    Spine - Follow-up         HPI:  Sherly Enciso is a 28 y o  female  who presents for       Visit 3/17/2022: Follow-up lumbar pain:  Improved  Patient has been attending formal physical therapy since last visit and she feels this has provided some slow improvement of her low back pain since attending    She has transition to a home exercise program at this point  She states she has not fully resolved from her pain as she still experiences interval episodes of midline and right middle low back pain the rarely radiates down her right lower extremity  She still feels that at night she can be awoken by the pain of her back but this happens infrequently and is progressively happening less over time since attending formal PT  She denies new injuries since last visit  She still describes the pain as a sharp/aching pain  She does not take any pain medications for this issue  Medical, Surgical, Family, and Social History    Past Medical History:   Diagnosis Date    Acne     was on Amoxicillin d/c once she found out she was pregnant    Angioma     developed during pregnancy    Asthma     exercise induced asthma no current inhaler    Migraine     Normal delivery     2016 daughter   Ceci Reyes Varicella     ?      Past Surgical History:   Procedure Laterality Date    TOOTH EXTRACTION       Social History   Social History     Substance and Sexual Activity   Alcohol Use Yes    Comment: social alcohol use per [employer]     Social History     Substance and Sexual Activity   Drug Use No     Social History     Tobacco Use   Smoking Status Never Smoker   Smokeless Tobacco Never Used     Family History   Problem Relation Age of Onset    Cancer Mother         hodgkins lymphome    No Known Problems Father     No Known Problems Sister     No Known Problems Brother     Cancer Maternal Grandmother         pancreatic    Heart disease Paternal Grandfather         CHF    No Known Problems Daughter      Allergies   Allergen Reactions    Pollen Extract Sneezing          Physical Exam  BP 97/62   Pulse 82   Ht 5' 6" (1 676 m)   Wt 82 1 kg (181 lb)   BMI 29 21 kg/m²     Constitutional:  see vital signs  Gen: well-developed, normocephalic/atraumatic, well-groomed  Eyes: No inflammation or discharge of conjunctiva or lids; sclera clear   Pharynx: no inflammation, lesion, or mass of lips  Neck: supple, no masses, non-distended  MSK: no inflammation, lesion, mass, or clubbing of nails and digits except for other than mentioned below  SKIN: no visible rashes or skin lesions  Pulmonary/Chest: Effort normal  No respiratory distress       Ortho Exam  BACK EXAM:  Gait: normal, no trendelenberg gait, no antalgic gait    BACK TENDERNESS:  Spinous Processes: +L5  Paraspinal Muscles: Right paralumbar  SI Joint: no  Sacrum: no    ROM:  Flexion: 100  Extension: 30  Lateral flexion: 30 b/l  Rotation: 30 b/l    DERMATOMAL SENSATION:  L1: normal   L2: normal   L3: normal   L4: normal   L5: normal   S1: normal    STRENGTH (bilateral):  Knee Extension: 5/5  Knee Flexion: 5/5  Foot Dorsiflexion: 5/5  Great Toe Extension: 5/5  Foot Plantarflexion: 5/5  Hip Flexion: 5/5      REFLEXES:  Patellar: 2+ bilateral  Achilles: 2+ bilateral  Clonus: negative bilateral    BACK:   SUPINE STRAIGHT LEG: negative    HIP:  LOG ROLL: negative  YOJANA: negative  FADIR: negative        Procedures

## 2022-06-07 DIAGNOSIS — F41.9 ANXIETY: ICD-10-CM

## 2022-06-07 RX ORDER — CITALOPRAM 20 MG/1
TABLET ORAL
Qty: 180 TABLET | Refills: 3 | Status: SHIPPED | OUTPATIENT
Start: 2022-06-07

## 2025-05-26 NOTE — PROGRESS NOTES
Check in done 
NST done for suspected fetal arrythmia  FHTs 140s, mod variability with accels, no decels noted  There is an audible arrythmia  [place] quiet except for some irritability  Dr Tesfaye Fernandez reviewed the strip  Pt reassured 
Problem List Items Addressed This Visit        Other    Supervision of low-risk pregnancy, third trimester - Primary    Fetal arrhythmia affecting pregnancy, antepartum     Irregularity on auscultation today  Suspect PACs, NST reactive 140's  No evidence tachy arrhythmia  She will see [facility] tomorrow, 10/26 for follow up scan  Kick counts reviewed, reassurance provided              Other Visit Diagnoses     Third trimester pregnancy        Relevant Orders    Ambulatory Referral to Maternal Fetal Medicine
hide